# Patient Record
Sex: FEMALE | NOT HISPANIC OR LATINO | URBAN - METROPOLITAN AREA
[De-identification: names, ages, dates, MRNs, and addresses within clinical notes are randomized per-mention and may not be internally consistent; named-entity substitution may affect disease eponyms.]

---

## 2021-03-04 ENCOUNTER — TELEPHONE (OUTPATIENT)
Dept: GASTROENTEROLOGY | Facility: CLINIC | Age: 36
End: 2021-03-04

## 2021-03-08 ENCOUNTER — OFFICE VISIT (OUTPATIENT)
Dept: GASTROENTEROLOGY | Facility: CLINIC | Age: 36
End: 2021-03-08
Payer: COMMERCIAL

## 2021-03-08 VITALS
HEART RATE: 49 BPM | WEIGHT: 163 LBS | DIASTOLIC BLOOD PRESSURE: 72 MMHG | BODY MASS INDEX: 25.58 KG/M2 | SYSTOLIC BLOOD PRESSURE: 114 MMHG | HEIGHT: 67 IN | TEMPERATURE: 98.2 F

## 2021-03-08 DIAGNOSIS — K92.1 MELENA: ICD-10-CM

## 2021-03-08 DIAGNOSIS — R19.4 CHANGE IN BOWEL HABIT: ICD-10-CM

## 2021-03-08 DIAGNOSIS — R10.84 GENERALIZED ABDOMINAL PAIN: Primary | ICD-10-CM

## 2021-03-08 PROCEDURE — 99204 OFFICE O/P NEW MOD 45 MIN: CPT | Performed by: PHYSICIAN ASSISTANT

## 2021-03-08 RX ORDER — DESVENLAFAXINE 50 MG/1
25 TABLET, EXTENDED RELEASE ORAL DAILY
COMMUNITY

## 2021-03-08 RX ORDER — MONTELUKAST SODIUM 10 MG/1
TABLET ORAL
COMMUNITY
Start: 2021-02-05

## 2021-03-08 RX ORDER — LAMOTRIGINE 100 MG/1
1 TABLET ORAL DAILY
COMMUNITY
Start: 2021-02-08 | End: 2021-05-09

## 2021-03-08 RX ORDER — BIOTIN 2500 MCG
CAPSULE ORAL
COMMUNITY

## 2021-03-08 RX ORDER — ONDANSETRON 4 MG/1
4 TABLET, ORALLY DISINTEGRATING ORAL EVERY 8 HOURS PRN
COMMUNITY
Start: 2021-02-26 | End: 2022-02-27

## 2021-03-08 RX ORDER — ONDANSETRON 4 MG/1
TABLET, FILM COATED ORAL
COMMUNITY
Start: 2021-02-26

## 2021-03-08 RX ORDER — DESVENLAFAXINE 25 MG/1
TABLET, EXTENDED RELEASE ORAL
COMMUNITY
Start: 2021-02-08

## 2021-03-08 RX ORDER — PANTOPRAZOLE SODIUM 20 MG/1
TABLET, DELAYED RELEASE ORAL
COMMUNITY
Start: 2021-02-27 | End: 2021-10-13

## 2021-03-08 RX ORDER — DICYCLOMINE HCL 20 MG
20 TABLET ORAL EVERY 6 HOURS
Qty: 120 TABLET | Refills: 1 | Status: SHIPPED | OUTPATIENT
Start: 2021-03-08 | End: 2021-10-13 | Stop reason: SDUPTHER

## 2021-03-08 NOTE — PROGRESS NOTES
[facility] - Outpatient Follow-up Note  Ani Guzman 38 y o  female MRN: [id number]  Encounter: [id number]          ASSESSMENT AND PLAN:      Diagnoses and all orders for this visit:    1  Generalized abdominal pain  -     Colonoscopy; Future  -     EGD; Future  -     dicyclomine (BENTYL) 20 mg tablet; Take 1 tablet (20 mg total) by mouth every 6 (six) hours  2  Melena  -     EGD; Future  3  Change in bowel habit  -     Colonoscopy; Future    Patient presenting with several months of abdominal pain associated with black stools and a change in bowel habits to more predominant constipation  Differential includes PUD given history of NSAID use, possible celiac disease, Crohn's disease, or functional disorder  Will plan for EGD and colonoscopy for further information  Will continue PPI for now and also add PRN use of dicyclomine for abdominal pain  If endoscopic evaluation is negative, could consider biliary source for abdominal pain though this is considered less likely  As she also has very heavy periods with increased abdominal pain during her cycle, endometriosis would also be in the differential and can be considered after GI workup is complete  Discussed recommendations for bland diet in the meantime  All questions were answered and patient is in agreement with this plan   ______________________________________________________________________    SUBJECTIVE:  Jean Pierre Azul is a 38year old female who presents to the office with symptoms of abdominal pain and a change in bowel habits x several months  She states she has had "stomach problems" her whole life, but they were generally consistent  Several months ago she notices that her BMs moved from typically soft-to-loose to less frequent  She maintains the urge to have a bowel movement but is often unable to go  One month ago, she was seen in the ER with reportedly black stools and exam suggested hemorrhoids    She denies rectal pain or itching  At the time of her ER visit, she was taking Aleve frequently for menstrual cramping  She notes the black stools have resolved, though she continues with constipation and abdominal pain  Her pain is often down the right side of the abdomen and in the LLQ  It can at times refer to the left shoulder  She also has associated epigastric pain/bloating and a sense of fullness after meals  She endorses early satiety and avoidance of food secondary to worsening post-prandial pain  She has lost 2 5lbs in the past several weeks  There is a family history of polyps in her father  No known family history of GI malignancy  She denies family history of IBD but there is a strong history of other autoimmune disease like rheumatoid arthritis and lupus  She denies dysuria, hematuria or other urinary symptoms  Her periods are regular, but heavy and associated with severe pelvic cramping  She follows with her GYN regularly for this  She works as a  and notes that she sits for prolonged periods during the day  REVIEW OF SYSTEMS IS OTHERWISE NEGATIVE  Historical Information   History reviewed  Anxiety, depression, PTSD noted in ER visit notes  No abdominal surgery  Social History   Social History     Substance and Sexual Activity   Alcohol Use Yes    Frequency: Monthly or less     Social History     Substance and Sexual Activity   Drug Use Yes    Types: Marijuana     Social History     Tobacco Use   Smoking Status Never Smoker   Smokeless Tobacco Former User     History reviewed  No pertinent family history      Meds/Allergies       Current Outpatient Medications:     Biotin 2500 MCG CAPS    desvenlafaxine succinate (PRISTIQ) 50 mg 24 hr tablet    Desvenlafaxine Succinate ER 25 MG TB24    lamoTRIgine (LaMICtal) 100 mg tablet    montelukast (SINGULAIR) 10 mg tablet    ondansetron (ZOFRAN) 4 mg tablet    ondansetron (ZOFRAN-ODT) 4 mg disintegrating tablet    pantoprazole (PROTONIX) 20 mg tablet    dicyclomine (BENTYL) 20 mg tablet    Allergies   Allergen Reactions    Poison Ivy Extract Hives     Pesticides    Apple Swelling    Bupropion Hives    Pear Swelling    Strawberry Extract Swelling    Other Rash    Risperidone Rash and Hives    Sertraline Rash and Hives           Objective     Blood pressure 114/72, pulse (!) 49, temperature 98 2 °F (36 8 °C), temperature source Tympanic, height 5' 7" (1 702 m), weight 73 9 kg (163 lb)  Body mass index is 25 53 kg/m²  PHYSICAL EXAM:      General Appearance:   Alert, cooperative, no distress   HEENT:   Normocephalic, atraumatic, sclera anicteric      Neck:  Supple, symmetrical, no lymphadenopathy, trachea midline   Lungs:   Clear to auscultation bilaterally; no rales, rhonchi or wheezing; respirations unlabored    Heart[de-identified]   Bradycardic, Regular rhythm; no murmur, rub, or gallop  Abdomen:   Soft, generalized tenderness, worse in RUQ, LLQ, RLQ, without rebound or guarding non-distended; positive bowel sounds; no masses, no organomegaly   Abdominal palpation triggered "intense cramping" which persisted for the remainder of the visit     Genitalia:   Deferred    Rectal:   Deferred    Extremities:  No cyanosis, clubbing or edema    Pulses:  2+ and symmetric    Skin:  No jaundice, rashes, or lesions              Jay Quinones PA-C

## 2021-03-08 NOTE — PATIENT INSTRUCTIONS
Colon/egd on 3/29/21 with Dr Negar Hernández at [facility] prep instructions given by Ariella MEYER

## 2021-03-29 ENCOUNTER — HOSPITAL ENCOUNTER (OUTPATIENT)
Dept: GASTROENTEROLOGY | Facility: HOSPITAL | Age: 36
Setting detail: OUTPATIENT SURGERY
Discharge: HOME | End: 2021-03-29
Attending: INTERNAL MEDICINE
Payer: COMMERCIAL

## 2021-03-29 ENCOUNTER — ANESTHESIA EVENT (OUTPATIENT)
Dept: GASTROENTEROLOGY | Facility: HOSPITAL | Age: 36
End: 2021-03-29

## 2021-03-29 ENCOUNTER — ANESTHESIA (OUTPATIENT)
Dept: GASTROENTEROLOGY | Facility: HOSPITAL | Age: 36
End: 2021-03-29

## 2021-03-29 VITALS
SYSTOLIC BLOOD PRESSURE: 108 MMHG | HEART RATE: 45 BPM | RESPIRATION RATE: 18 BRPM | DIASTOLIC BLOOD PRESSURE: 56 MMHG | OXYGEN SATURATION: 100 % | TEMPERATURE: 96.6 F

## 2021-03-29 DIAGNOSIS — R10.84 GENERALIZED ABDOMINAL PAIN: ICD-10-CM

## 2021-03-29 DIAGNOSIS — R19.4 CHANGE IN BOWEL HABIT: ICD-10-CM

## 2021-03-29 DIAGNOSIS — K92.1 MELENA: ICD-10-CM

## 2021-03-29 PROBLEM — K21.9 GASTROESOPHAGEAL REFLUX DISEASE: Status: ACTIVE | Noted: 2021-03-29

## 2021-03-29 PROBLEM — F41.9 ANXIETY: Status: ACTIVE | Noted: 2021-03-29

## 2021-03-29 PROBLEM — F32.A DEPRESSION: Status: ACTIVE | Noted: 2021-03-29

## 2021-03-29 PROCEDURE — 88305 TISSUE EXAM BY PATHOLOGIST: CPT | Performed by: PATHOLOGY

## 2021-03-29 PROCEDURE — 43239 EGD BIOPSY SINGLE/MULTIPLE: CPT | Performed by: INTERNAL MEDICINE

## 2021-03-29 PROCEDURE — 45380 COLONOSCOPY AND BIOPSY: CPT | Performed by: INTERNAL MEDICINE

## 2021-03-29 PROCEDURE — 45385 COLONOSCOPY W/LESION REMOVAL: CPT | Performed by: INTERNAL MEDICINE

## 2021-03-29 RX ORDER — PROPOFOL 10 MG/ML
INJECTION, EMULSION INTRAVENOUS AS NEEDED
Status: DISCONTINUED | OUTPATIENT
Start: 2021-03-29 | End: 2021-03-29

## 2021-03-29 RX ORDER — SODIUM CHLORIDE 9 MG/ML
INJECTION, SOLUTION INTRAVENOUS CONTINUOUS PRN
Status: DISCONTINUED | OUTPATIENT
Start: 2021-03-29 | End: 2021-03-29

## 2021-03-29 RX ORDER — PROPOFOL 10 MG/ML
INJECTION, EMULSION INTRAVENOUS CONTINUOUS PRN
Status: DISCONTINUED | OUTPATIENT
Start: 2021-03-29 | End: 2021-03-29

## 2021-03-29 RX ORDER — SODIUM CHLORIDE 9 MG/ML
125 INJECTION, SOLUTION INTRAVENOUS CONTINUOUS
Status: CANCELLED | OUTPATIENT
Start: 2021-03-29

## 2021-03-29 RX ADMIN — SODIUM CHLORIDE: 9 INJECTION, SOLUTION INTRAVENOUS at 13:18

## 2021-03-29 RX ADMIN — PROPOFOL 100 MG: 10 INJECTION, EMULSION INTRAVENOUS at 13:32

## 2021-03-29 RX ADMIN — PROPOFOL 50 MG: 10 INJECTION, EMULSION INTRAVENOUS at 13:30

## 2021-03-29 RX ADMIN — PROPOFOL 50 MG: 10 INJECTION, EMULSION INTRAVENOUS at 13:56

## 2021-03-29 RX ADMIN — PROPOFOL 100 MG: 10 INJECTION, EMULSION INTRAVENOUS at 13:34

## 2021-03-29 RX ADMIN — LIDOCAINE HYDROCHLORIDE 80 MG: 20 INJECTION, SOLUTION INTRAVENOUS at 13:28

## 2021-03-29 RX ADMIN — PROPOFOL 100 MG: 10 INJECTION, EMULSION INTRAVENOUS at 13:43

## 2021-03-29 RX ADMIN — PROPOFOL 150 MCG/KG/MIN: 10 INJECTION, EMULSION INTRAVENOUS at 13:44

## 2021-03-29 RX ADMIN — PROPOFOL 150 MG: 10 INJECTION, EMULSION INTRAVENOUS at 13:28

## 2021-03-29 RX ADMIN — PROPOFOL 100 MG: 10 INJECTION, EMULSION INTRAVENOUS at 13:46

## 2021-03-29 NOTE — ANESTHESIA PREPROCEDURE EVALUATION
Procedure:  COLONOSCOPY  EGD    Relevant Problems   ANESTHESIA (within normal limits)   (-) History of anesthesia complications      CARDIO   (-) Chest pain   (-) GILBERT (dyspnea on exertion)      GI/HEPATIC   (+) Gastroesophageal reflux disease      NEURO/PSYCH   (+) Anxiety   (+) Depression      PULMONARY   (-) Shortness of breath   (-) URI (upper respiratory infection)        Physical Exam    Airway    Mallampati score: I  TM Distance: >3 FB  Neck ROM: full     Dental   No notable dental hx     Cardiovascular      Pulmonary      Other Findings        Anesthesia Plan  ASA Score- 2     Anesthesia Type- IV sedation with anesthesia with ASA Monitors  Additional Monitors:   Airway Plan:           Plan Factors-Exercise tolerance (METS): >4 METS  Chart reviewed  Induction- intravenous  Postoperative Plan-     Informed Consent- Anesthetic plan and risks discussed with patient  I personally reviewed this patient with the CRNA  Discussed and agreed on the Anesthesia Plan with the CRNA  Giselle Sinclair

## 2021-03-29 NOTE — ANESTHESIA POSTPROCEDURE EVALUATION
Post-Op Assessment Note    CV Status:  Stable    Pain management: adequate     Mental Status:  Sleepy (responds to name)   Hydration Status:  Euvolemic   PONV Controlled:  Controlled   Airway Patency:  Patent      Post Op Vitals Reviewed: Yes      Staff: Anesthesiologist, CRNA   Comments: VSS, reflexes intact        No complications documented      BP      Temp     Pulse 70 (03/29/21 1411)   Resp 16 (03/29/21 1411)    SpO2 99 % (03/29/21 1411)

## 2021-03-29 NOTE — H&P
History and Physical -  [facility]  Lisa Celeste 38 y o  female MRN: [id number]    HPI: Lisa Celeste is a 38y o  year old female who presents with abdominal pain, diarrhea, melena  Review of Systems    Historical Information   Past Medical History:   Diagnosis Date    Anxiety     Depression      Past Surgical History:   Procedure Laterality Date    ULNAR NERVE REPAIR       Social History   Social History     Substance and Sexual Activity   Alcohol Use Yes    Frequency: Monthly or less     Social History     Substance and Sexual Activity   Drug Use Yes    Types: Marijuana     Social History     Tobacco Use   Smoking Status Former Smoker   Smokeless Tobacco Former User     History reviewed  No pertinent family history  Meds/Allergies     (Not in a hospital admission)      Allergies   Allergen Reactions    Poison Ivy Extract Hives     Pesticides    Apple Swelling    Bupropion Hives    Pear Swelling    Strawberry Extract Swelling    Other Rash    Risperidone Rash and Hives    Sertraline Rash and Hives       Objective     /57   Pulse (!) 45   Temp (!) 96 7 °F (35 9 °C) (Tympanic)   Resp 18   LMP 03/24/2021   SpO2 100%       PHYSICAL EXAM    Gen: NAD  CV: RRR  CHEST: Clear  ABD: soft, NT/ND  EXT: no edema  Neuro: AAO      ASSESSMENT/PLAN:  This is a 38y o  year old female here for EGD and Colonsocopy for abdominal pain, diarrhea and melena  PLAN:   Procedure: EGD and Colonoscopy

## 2021-04-27 ENCOUNTER — TELEPHONE (OUTPATIENT)
Dept: GASTROENTEROLOGY | Facility: MEDICAL CENTER | Age: 36
End: 2021-04-27

## 2021-04-27 NOTE — TELEPHONE ENCOUNTER
----- Message from Wendy Barnes MD sent at 4/26/2021  9:02 AM EDT -----  Please call patient :   biopsies are mostly benign  Colon polyps were benign but precancerous  Recommend repeat colonoscopy in 5 years  Recommend siblings to have their colonoscopies done as well

## 2021-10-13 ENCOUNTER — OFFICE VISIT (OUTPATIENT)
Dept: GASTROENTEROLOGY | Facility: CLINIC | Age: 36
End: 2021-10-13
Payer: COMMERCIAL

## 2021-10-13 VITALS
WEIGHT: 157.2 LBS | DIASTOLIC BLOOD PRESSURE: 68 MMHG | TEMPERATURE: 96.1 F | SYSTOLIC BLOOD PRESSURE: 110 MMHG | BODY MASS INDEX: 24.67 KG/M2 | HEIGHT: 67 IN

## 2021-10-13 DIAGNOSIS — R10.11 RUQ ABDOMINAL PAIN: ICD-10-CM

## 2021-10-13 DIAGNOSIS — K58.2 IRRITABLE BOWEL SYNDROME WITH BOTH CONSTIPATION AND DIARRHEA: ICD-10-CM

## 2021-10-13 DIAGNOSIS — Z85.038 ENCOUNTER FOR FOLLOW-UP SURVEILLANCE OF COLON CANCER: ICD-10-CM

## 2021-10-13 DIAGNOSIS — Z08 ENCOUNTER FOR FOLLOW-UP SURVEILLANCE OF COLON CANCER: ICD-10-CM

## 2021-10-13 DIAGNOSIS — R10.84 GENERALIZED ABDOMINAL PAIN: Primary | ICD-10-CM

## 2021-10-13 PROCEDURE — 99214 OFFICE O/P EST MOD 30 MIN: CPT | Performed by: INTERNAL MEDICINE

## 2021-10-13 RX ORDER — DICYCLOMINE HCL 20 MG
20 TABLET ORAL EVERY 6 HOURS
Qty: 120 TABLET | Refills: 1 | Status: SHIPPED | OUTPATIENT
Start: 2021-10-13

## 2021-10-13 RX ORDER — PSEUDOEPHEDRINE HCL 30 MG
TABLET ORAL
COMMUNITY
Start: 2021-06-14 | End: 2022-06-15

## 2021-10-13 RX ORDER — BUSPIRONE HYDROCHLORIDE 15 MG/1
TABLET ORAL
COMMUNITY
Start: 2021-09-14

## 2021-10-13 RX ORDER — BENZONATATE 100 MG/1
CAPSULE ORAL
COMMUNITY
Start: 2021-09-29

## 2021-10-13 RX ORDER — ALBUTEROL SULFATE 90 UG/1
AEROSOL, METERED RESPIRATORY (INHALATION)
COMMUNITY
Start: 2021-09-29

## 2021-10-13 RX ORDER — NALTREXONE HYDROCHLORIDE 50 MG/1
TABLET, FILM COATED ORAL
COMMUNITY
Start: 2021-09-14 | End: 2021-12-13

## 2022-01-27 ENCOUNTER — TELEPHONE (OUTPATIENT)
Dept: GASTROENTEROLOGY | Facility: AMBULARY SURGERY CENTER | Age: 37
End: 2022-01-27

## 2022-01-27 NOTE — TELEPHONE ENCOUNTER
I returned patients call, pt had imaging done at [facility] and would like you to review    I advised for her to have imaging faxed to our office    Patient states maybe you can look online through  portal    Patient would like to be cleared for Hysterectomy     Thank You

## 2022-01-27 NOTE — TELEPHONE ENCOUNTER
Patients GI provider:  Dr Brandon Falcon    Number to return call: [phone]     Reason for call: Pt calling requesting to speak with someone regarding a medical  clearance for surgery and to review a photo of her liver       Scheduled procedure/appointment date if applicable: Apt/procedure n/a

## 2022-02-23 ENCOUNTER — TELEPHONE (OUTPATIENT)
Dept: GASTROENTEROLOGY | Facility: CLINIC | Age: 37
End: 2022-02-23

## 2022-03-09 ENCOUNTER — TELEMEDICINE (OUTPATIENT)
Dept: GASTROENTEROLOGY | Facility: CLINIC | Age: 37
End: 2022-03-09
Payer: COMMERCIAL

## 2022-03-09 DIAGNOSIS — D18.03 LIVER HEMANGIOMA: ICD-10-CM

## 2022-03-09 DIAGNOSIS — K59.00 CONSTIPATION, UNSPECIFIED CONSTIPATION TYPE: Primary | ICD-10-CM

## 2022-03-09 DIAGNOSIS — R10.84 GENERALIZED ABDOMINAL PAIN: ICD-10-CM

## 2022-03-09 DIAGNOSIS — K56.1 INTUSSUSCEPTION (HCC): ICD-10-CM

## 2022-03-09 PROCEDURE — 99214 OFFICE O/P EST MOD 30 MIN: CPT | Performed by: INTERNAL MEDICINE

## 2022-03-09 RX ORDER — POLYETHYLENE GLYCOL 3350 17 G/17G
17 POWDER, FOR SOLUTION ORAL DAILY
Qty: 30 EACH | Refills: 3 | Status: SHIPPED | OUTPATIENT
Start: 2022-03-09
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
## 2022-03-11 ENCOUNTER — TELEPHONE (OUTPATIENT)
Dept: OTHER | Facility: OTHER | Age: 37
End: 2022-03-11

## 2022-03-11 NOTE — TELEPHONE ENCOUNTER
Pt called in stating her surgeon, Dr Vianney Raymond has had some difficulty reaching this office and would like clearance faxed to [phone]

## 2022-03-14 ENCOUNTER — TELEPHONE (OUTPATIENT)
Dept: GASTROENTEROLOGY | Facility: CLINIC | Age: 37
End: 2022-03-14

## 2022-03-14 NOTE — TELEPHONE ENCOUNTER
Patients GI provider:  Dr Hermelinda Dong    Number to return call: [phone]    Reason for call: Lenn Slot from [address] [facility] called requesting clearance for pt's upcoming surgery on 03/16/22  Pt was seen by Dr Hermelinda Dong  03/09/22  A clearance form will be faxed to [facility]  tomorrow when Lenn Slot comes back into the office  Please have someone fill out and fax back asap  A clearance form was faxed on 02/22/22 but I did not see anything in pt's chart      Scheduled procedure/appointment date if applicable: NA

## 2023-05-10 ENCOUNTER — TRANSCRIBE ORDERS (OUTPATIENT)
Dept: GASTROENTEROLOGY | Facility: CLINIC | Age: 38
End: 2023-05-10

## 2023-05-10 ENCOUNTER — TELEPHONE (OUTPATIENT)
Dept: GASTROENTEROLOGY | Facility: CLINIC | Age: 38
End: 2023-05-10

## 2023-05-17 ENCOUNTER — CONSULT (OUTPATIENT)
Dept: GASTROENTEROLOGY | Facility: MEDICAL CENTER | Age: 38
End: 2023-05-17

## 2023-05-17 VITALS
TEMPERATURE: 98.7 F | HEART RATE: 66 BPM | WEIGHT: 151 LBS | DIASTOLIC BLOOD PRESSURE: 77 MMHG | BODY MASS INDEX: 23.65 KG/M2 | SYSTOLIC BLOOD PRESSURE: 128 MMHG

## 2023-05-17 DIAGNOSIS — D12.6 COLON ADENOMAS: ICD-10-CM

## 2023-05-17 DIAGNOSIS — R10.13 ABDOMINAL WALL PAIN IN EPIGASTRIC REGION: ICD-10-CM

## 2023-05-17 DIAGNOSIS — R10.13 EPIGASTRIC PAIN: Primary | ICD-10-CM

## 2023-05-17 DIAGNOSIS — R11.0 NAUSEA: ICD-10-CM

## 2023-05-17 RX ORDER — OMEPRAZOLE 20 MG/1
20 CAPSULE, DELAYED RELEASE ORAL DAILY
Qty: 30 CAPSULE | Refills: 2 | Status: SHIPPED | OUTPATIENT
Start: 2023-05-17

## 2023-05-17 NOTE — PROGRESS NOTES
[facility] - Outpatient Follow-up Note  Queen Courtney 42 y o  female MRN: [id number]  Encounter: [id number]          ASSESSMENT AND PLAN:    41-year-old female with prior diagnoses of IBS-C and GERD referred for an acute episode of severe upper abdominal wall pain, nausea, vomiting  Based on description of this episode, I am most suspicious for a musculoskeletal etiology  She does report that after the pain developed she did have some nausea and vomiting but sounds like this was more related to severe pain as opposed to an underlying GI process  Reviewed her ED visit and CT scan and discussed that there is no evidence of a hernia  On my physical exam, I do not see any diastasis recti and even if she did have this, I would not expect it to cause significant pain  1  Abdominal wall pain in epigastric region  2  Epigastric pain  3  Nausea  Monitor for recurrence of symptoms  Encouraged patient to discuss with her PCP whether referral to physical therapy for core strengthening/stabilization may be of benefit  If episode recurs, would try PPI though it does not sound peptic in nature and would not expect a peptic process to resolve without intervention in 4 days  - omeprazole (PriLOSEC) 20 mg delayed release capsule; Take 1 capsule (20 mg total) by mouth daily  Dispense: 30 capsule; Refill: 2      4  Colon adenomas  Due for repeat colonoscopy in 2026  ______________________________________________________________________    SUBJECTIVE:    Was on a plane and bent over  Developed had lump protruding from abdomen with severe pain in the area  Had to stretch up to try to relieve lump/pain  When got off the flight, had nausea, vomiting and upper abdominal pain  Upper abdominal pain felt like she had done a million crunches and muscles got stuck  Spent 4 days on bed rest because she thought she had a hernia  Gradually increased activity and advanced diet    Went to ED and was told she has an ovarian cyst and diastasis recti  Not having any acid reflux  Has been doing well from a GI perspective  Not requiring Bentyl, PPI, or other GI medications  CT 5/10/23  Abdomen:   Lung Bases: Mild dependent atelectasis is noted in the lower lobes  Calcified granulomas noted in the left lower lobe  Liver: Probable meningioma in the right lobe is grossly unchanged since prior exam  The liver is otherwise unremarkable  Gallbladder/Bile ducts: Normal      Spleen: Normal      Pancreas: Normal      Adrenal glands: Normal      Kidneys/Ureters: Unremarkable  Bowel/Mesentery: Normal  The appendix appears normal      Lymph nodes: Normal      Vessels: Normal      Abdominal Wall: Normal  No evidence of abdominal wall hernia or mass lesion        Pelvis:   Uterus is surgically absent  Rim-enhancing lesion in the right posterior pelvis   measures 21 x 17 mm  This can indicate a normal right ovarian follicle or   possibly a cyst      Urinary Bladder: Normal      Lymph nodes:  Normal        Bones: No acute osseous abnormality  Last seen 10/13/21 for IBS-C, abdominal pain, dyspepsia, and colon polyps  EGD 3/29/21  Mild nodular esophagus - biopsies done  Normal stomach and duodenum - biopsies done  A  Duodenum:  - Benign duodenal mucosa without specific histopathologic abnormality   - No villous atrophy, no intraepithelial lymphocytosis and no crypt hyperplasia to suggest malabsorptive enteropathy   - No active or chronic duodenitis  - No epithelial dysplasia and no evidence of malignancy  B  Stomach biopsy:  - Minimal chronic inactive oxyntic gastritis, negative for curvilinear Helicobacter pylori organisms by routine H&E stains   - No atrophy or intestinal metaplasia identified   - No epithelial dysplasia and no evidence of malignancy      C   Esophagus:  - Benign squamous epithelium without specific histopathologic abnormality   - Eosinophils are fewer than 2 cells per high power field in squamous epithelium, negative for eosinophilic esophagitis  - No intestinal [goblet cell] metaplasia; no columnar epithelium seen  - No epithelial dysplasia and no evidence of malignancy  Colonoscopy 3/29/21  Small hepatic flexure polyps - removed  Otherwise normal colon - random biopsies done in colon and terminal ileum  D  Terminal ileum:  - Benign small intestinal mucosa without specific histopathologic abnormality   - No villous atrophy, no intraepithelial lymphocytosis and no crypt hyperplasia to suggest malabsorptive enteropathy   - No active or chronic enteritis  - No epithelial dysplasia and no evidence of malignancy      E  Random colon:  - Benign colonic mucosa with nonspecific reactive changes including mild eosinophilia  - No thickened basement membranes or intraepithelial lymphocytosis to suggest microscopic colitis (i e  collagenous colitis or lymphocytic colitis, resp  )   - No active or chronic colitis  - No glandular dysplasia and no evidence of malignancy      F  Colon @ hepatic flexure polyp x 3:  - Tubular adenoma (adenomatous polyp) x 3   - No high grade dysplasia and no evidence of malignancy  REVIEW OF SYSTEMS IS OTHERWISE NEGATIVE  Historical Information   Past Medical History:   Diagnosis Date   • Anxiety    • Depression      Past Surgical History:   Procedure Laterality Date   • COLONOSCOPY  03/29/2021   • ULNAR NERVE REPAIR     • UPPER GASTROINTESTINAL ENDOSCOPY  03/29/2021     Social History   Social History     Substance and Sexual Activity   Alcohol Use Yes     Social History     Substance and Sexual Activity   Drug Use Yes   • Types: Marijuana     Social History     Tobacco Use   Smoking Status Former   Smokeless Tobacco Former     History reviewed  No pertinent family history      Meds/Allergies       Current Outpatient Medications:   •  albuterol (PROVENTIL HFA,VENTOLIN HFA) 90 mcg/act inhaler  •  benzonatate (TESSALON PERLES) 100 mg capsule  •  montelukast (SINGULAIR) 10 mg tablet  •  Biotin 2500 MCG CAPS  •  busPIRone (BUSPAR) 15 mg tablet  •  desvenlafaxine succinate (PRISTIQ) 50 mg 24 hr tablet  •  Desvenlafaxine Succinate ER 25 MG TB24  •  dicyclomine (BENTYL) 20 mg tablet  •  Docusate Sodium (DSS) 100 MG CAPS  •  lamoTRIgine (LaMICtal) 100 mg tablet  •  naltrexone (REVIA) 50 mg tablet  •  ondansetron (ZOFRAN) 4 mg tablet  •  ondansetron (ZOFRAN-ODT) 4 mg disintegrating tablet  •  polyethylene glycol (MIRALAX) 17 g packet    Allergies   Allergen Reactions   • Poison Ivy Extract Hives     Pesticides   • Apple - Food Allergy Swelling   • Bupropion Hives   • Pear - Food Allergy Swelling   • Strawberry Extract - Food Allergy Swelling   • Other Rash   • Risperidone Rash and Hives   • Sertraline Rash and Hives           Objective     Blood pressure 128/77, pulse 66, temperature 98 7 °F (37 1 °C), weight 68 5 kg (151 lb)  Body mass index is 23 65 kg/m²  PHYSICAL EXAM:      General Appearance:   Alert, cooperative, no distress   HEENT:   Normocephalic, atraumatic, anicteric  Neck:  Supple, symmetrical, trachea midline   Lungs:   Clear to auscultation bilaterally; no rales, rhonchi or wheezing; respirations unlabored    Heart[de-identified]   Regular rate and rhythm; no murmur, rub, or gallop  Abdomen:   Soft, non-tender, non-distended; normal bowel sounds; no masses, no organomegaly    Genitalia:   Deferred    Rectal:   Deferred    Extremities:  No cyanosis, clubbing or edema    Pulses:  2+ and symmetric    Skin:  No jaundice, rashes, or lesions    Lymph nodes:  No palpable cervical lymphadenopathy        Lab Results:   No visits with results within 1 Day(s) from this visit     Latest known visit with results is:   Hospital Outpatient Visit on 03/29/2021   Component Date Value   • Case Report 03/29/2021                      Value:Surgical Pathology Report                         Case: [id number]                                   Authorizing Provider:  Corine Ruiz MD Collected:           03/29/2021 1331              Ordering Location:     [address] [place] [address]      Received:            03/29/2021 [facility]                                                           Pathologist:           Sara Donnelly MD                                                         Specimens:   A) - Duodenum                                                                                       B) - Stomach, r/o h  pylori                                                                         C) - Esophagus                                                                                      D) - Terminal Ileum                                                                                 E) - Colon, random                                                                                                            F) - Polyp, Colorectal, hepatic flexure polyp x 3- cold snare                             • Final Diagnosis 03/29/2021                      Value: This result contains rich text formatting which cannot be displayed here  • Additional Information 03/29/2021                      Value: This result contains rich text formatting which cannot be displayed here  • Synoptic Checklist 03/29/2021                      Value:  (COLON/RECTUM POLYP FORM - GI - F)                                                                                                                 : Adenoma(s)     • Gross Description 03/29/2021                      Value: This result contains rich text formatting which cannot be displayed here  Radiology Results:   No results found

## 2023-11-02 ENCOUNTER — OFFICE VISIT (OUTPATIENT)
Dept: URGENT CARE | Facility: CLINIC | Age: 38
End: 2023-11-02
Payer: OTHER GOVERNMENT

## 2023-11-02 VITALS
HEART RATE: 47 BPM | OXYGEN SATURATION: 98 % | TEMPERATURE: 98 F | BODY MASS INDEX: 25.22 KG/M2 | HEIGHT: 67 IN | DIASTOLIC BLOOD PRESSURE: 66 MMHG | SYSTOLIC BLOOD PRESSURE: 115 MMHG | RESPIRATION RATE: 17 BRPM | WEIGHT: 160.69 LBS

## 2023-11-02 DIAGNOSIS — H57.89 DISCHARGE OF RIGHT EYE: ICD-10-CM

## 2023-11-02 DIAGNOSIS — H10.021 MUCOPURULENT CONJUNCTIVITIS, RIGHT: Primary | ICD-10-CM

## 2023-11-02 DIAGNOSIS — R51.9 ACUTE NONINTRACTABLE HEADACHE, UNSPECIFIED HEADACHE TYPE: ICD-10-CM

## 2023-11-02 DIAGNOSIS — H57.11 ACUTE RIGHT EYE PAIN: ICD-10-CM

## 2023-11-02 DIAGNOSIS — H57.89 REDNESS OF RIGHT EYE: ICD-10-CM

## 2023-11-02 PROCEDURE — 96372 PR INJECTION,THERAP/PROPH/DIAG2ST, IM OR SUBCUT: ICD-10-PCS | Mod: S$GLB,,, | Performed by: PHYSICIAN ASSISTANT

## 2023-11-02 PROCEDURE — 96372 THER/PROPH/DIAG INJ SC/IM: CPT | Mod: S$GLB,,, | Performed by: PHYSICIAN ASSISTANT

## 2023-11-02 PROCEDURE — 99203 PR OFFICE/OUTPT VISIT, NEW, LEVL III, 30-44 MIN: ICD-10-PCS | Mod: 25,S$GLB,, | Performed by: PHYSICIAN ASSISTANT

## 2023-11-02 PROCEDURE — 99203 OFFICE O/P NEW LOW 30 MIN: CPT | Mod: 25,S$GLB,, | Performed by: PHYSICIAN ASSISTANT

## 2023-11-02 RX ORDER — KETOROLAC TROMETHAMINE 30 MG/ML
30 INJECTION, SOLUTION INTRAMUSCULAR; INTRAVENOUS
Status: COMPLETED | OUTPATIENT
Start: 2023-11-02 | End: 2023-11-02

## 2023-11-02 RX ORDER — ACETAMINOPHEN 500 MG
1000 TABLET ORAL
Status: COMPLETED | OUTPATIENT
Start: 2023-11-02 | End: 2023-11-02

## 2023-11-02 RX ORDER — FLUOXETINE 20 MG/1
1 TABLET ORAL EVERY MORNING
COMMUNITY

## 2023-11-02 RX ORDER — GLUCOSAMINE/CHONDR SU A SOD 750-600 MG
TABLET ORAL
COMMUNITY

## 2023-11-02 RX ORDER — METHYLPHENIDATE HYDROCHLORIDE 5 MG/1
5 TABLET ORAL 2 TIMES DAILY
COMMUNITY

## 2023-11-02 RX ORDER — DESVENLAFAXINE SUCCINATE 25 MG/1
1 TABLET, EXTENDED RELEASE ORAL EVERY MORNING
COMMUNITY
Start: 2023-04-13

## 2023-11-02 RX ORDER — POLYMYXIN B SULFATE AND TRIMETHOPRIM 1; 10000 MG/ML; [USP'U]/ML
1 SOLUTION OPHTHALMIC EVERY 4 HOURS
Qty: 10 ML | Refills: 0 | Status: SHIPPED | OUTPATIENT
Start: 2023-11-02 | End: 2023-11-09

## 2023-11-02 RX ORDER — DESVENLAFAXINE SUCCINATE 50 MG/1
25 TABLET, EXTENDED RELEASE ORAL
COMMUNITY

## 2023-11-02 RX ORDER — METHYLPHENIDATE HYDROCHLORIDE 36 MG/1
36 TABLET ORAL
COMMUNITY
Start: 2023-10-30

## 2023-11-02 RX ORDER — METHOCARBAMOL 750 MG/1
750 TABLET, FILM COATED ORAL 4 TIMES DAILY PRN
COMMUNITY

## 2023-11-02 RX ORDER — MONTELUKAST SODIUM 10 MG/1
1 TABLET ORAL DAILY
COMMUNITY
Start: 2022-11-23 | End: 2023-11-24

## 2023-11-02 RX ORDER — METHYLPHENIDATE HYDROCHLORIDE 36 MG/1
36 TABLET ORAL EVERY MORNING
COMMUNITY
Start: 2023-10-30

## 2023-11-02 RX ORDER — LAMOTRIGINE 25 MG/1
25 TABLET ORAL EVERY MORNING
COMMUNITY

## 2023-11-02 RX ORDER — OMEPRAZOLE 20 MG/1
20 CAPSULE, DELAYED RELEASE ORAL
COMMUNITY
Start: 2023-05-17

## 2023-11-02 RX ORDER — LAMOTRIGINE 100 MG/1
100 TABLET ORAL
COMMUNITY
Start: 2023-04-13

## 2023-11-02 RX ORDER — TRAZODONE HYDROCHLORIDE 100 MG/1
1 TABLET ORAL NIGHTLY PRN
COMMUNITY
Start: 2022-11-10 | End: 2024-04-13

## 2023-11-02 RX ORDER — METHYLPHENIDATE HYDROCHLORIDE 36 MG/1
TABLET ORAL
COMMUNITY
Start: 2023-10-30 | End: 2023-11-29

## 2023-11-02 RX ADMIN — Medication 1000 MG: at 01:11

## 2023-11-02 RX ADMIN — KETOROLAC TROMETHAMINE 30 MG: 30 INJECTION, SOLUTION INTRAMUSCULAR; INTRAVENOUS at 01:11

## 2023-11-02 NOTE — PATIENT INSTRUCTIONS
- Rest.    - Drink plenty of fluids.    - Acetaminophen (tylenol) or Ibuprofen (advil,motrin) as directed as needed for fever/pain. Avoid tylenol if you have a history of liver disease. Do not take ibuprofen if you have a history of GI bleeding, kidney disease, or if you take blood thinners.     - you received a shot of toradol in clinic today, so do not take ibuprofen or aleve until at least 8 hours from now.     - you also received 1000 mg of Tylenol today in clinic, do not take Tylenol again for 6 hours.    - Follow up with your eye doctor as directed in the next 3-5 days if symptoms persist.  You can call [phone] to schedule an appointment with the appropriate provider.    - Go to the ER or seek medical attention immediately if you develop new or worsening symptoms.     - You must understand that you have received an Urgent Care treatment only and that you may be released before all of your medical problems are known or treated.   - You, the patient, will arrange for follow up care as instructed.   - If your condition worsens or fails to improve we recommend that you receive another evaluation at the ER immediately or contact your PCP to discuss your concerns or return here.

## 2023-11-02 NOTE — PROGRESS NOTES
"Subjective:      Patient ID: Malini Truong is a 38 y.o. female.    Vitals:  height is 5' 7" (1.702 m) and weight is 72.9 kg (160 lb 11.5 oz). Her oral temperature is 98.3 °F (36.8 °C). Her blood pressure is 115/66 and her pulse is 47 (abnormal). Her respiration is 17 and oxygen saturation is 98%.     Chief Complaint: Eye Problem    Pt is a 38-year-old female who presents today with chief complaint of right eye redness, pain, discharge, itching, swelling.  presents today w/ rt eye irritation ; onset Saturday 10/28/23. Pt states that 6 days ago she began experiencing right eye irritation with feeling of dryness and possible foreign body sensation.  Patient states that she flushed the eye.  The following day symptoms worsened and I has been consistently red with discharge.  Discharge is sometimes watery, but often times purulent/pus/mucoid.  Patient states that when she wakes up in the morning her eye is swollen crusted shut with purulent discharge that she needs to use a warm compress on.  Patient states that due to the discharge she has a sensation of blurred vision of the right eye.  She wears glasses, does not wear contact lenses.  Patient states that right eye is sensitive to light since onset of symptoms.  She has used OTC drops.  No recent URI symptoms.  No injury or trauma.  No history of autoimmune disorder.  Due to the eye problem, she has experienced headache as well.  No fever.    Eye Problem   The right eye is affected. This is a new problem. The current episode started in the past 7 days. The problem occurs constantly. The problem has been unchanged. There was no injury mechanism. The pain is at a severity of 8/10. There is No known exposure to pink eye. She Does not wear contacts. Associated symptoms include blurred vision, an eye discharge, eye redness, itching and photophobia. Pertinent negatives include no double vision, fever, foreign body sensation, recent URI or vomiting. She has tried eye drops and " water for the symptoms. The treatment provided mild relief.       Constitution: Negative for chills, sweating, fatigue and fever.   HENT:  Negative for congestion and sore throat.    Neck: Negative for neck pain and neck stiffness.   Cardiovascular:  Negative for chest pain, leg swelling and palpitations.   Eyes:  Positive for eye discharge, eye itching, eye pain, eye redness, photophobia, blurred vision and eyelid swelling (in the morning). Negative for eye trauma, foreign body in eye, vision loss and double vision.   Respiratory:  Negative for cough, sputum production and shortness of breath.    Gastrointestinal:  Negative for abdominal pain, vomiting and diarrhea.   Genitourinary:  Negative for dysuria, frequency and urgency.   Musculoskeletal:  Negative for pain and joint swelling.   Skin:  Negative for color change and rash.   Neurological:  Positive for headaches. Negative for dizziness, light-headedness, facial drooping, speech difficulty, disorientation, altered mental status, numbness and tingling.   Psychiatric/Behavioral:  Negative for altered mental status and disorientation.       Objective:     Physical Exam   Constitutional: She is oriented to person, place, and time. She appears well-developed.  Non-toxic appearance. She does not appear ill. No distress.   HENT:   Head: Normocephalic and atraumatic.   Ears:   Right Ear: External ear normal.   Left Ear: External ear normal.   Nose: Nose normal.   Mouth/Throat: Oropharynx is clear and moist.   Eyes: EOM and lids are normal. Pupils are equal, round, and reactive to light. Lids are everted and swept, no foreign bodies found. Right eye exhibits discharge. Right eye exhibits no hordeolum. No foreign body present in the right eye. Right conjunctiva is injected.   Slit lamp exam:       The right eye shows no corneal abrasion. Extraocular movement intact vision grossly intact gaze aligned appropriately      Comments: EOMI PERRLA.  Right eye conjunctival  injection.  No stye.  No periorbital erythema or swelling.  No foreign body.  Fluorescein exam is negative.  Patient had improvement of discomfort with application of anesthetic drops.   Neck: Trachea normal and phonation normal. Neck supple.   Pulmonary/Chest: Effort normal. No accessory muscle usage. No tachypnea and no bradypnea.   Musculoskeletal: Normal range of motion.         General: Normal range of motion.   Neurological: no focal deficit. She is alert and oriented to person, place, and time. She displays no weakness, facial symmetry and no dysarthria. No cranial nerve deficit. Gait normal. GCS eye subscore is 4. GCS verbal subscore is 5. GCS motor subscore is 6.   Skin: Skin is warm, dry, intact and not diaphoretic.   Psychiatric: Her speech is normal and behavior is normal. Judgment and thought content normal.   Nursing note and vitals reviewed.      Assessment:     1. Mucopurulent conjunctivitis, right    2. Acute right eye pain    3. Redness of right eye    4. Discharge of right eye    5. Acute nonintractable headache, unspecified headache type      Vision Screening    Right eye Left eye Both eyes   Without correction      With correction 20/25 20/20 20/20       Plan:     - Discussed ddx, home care, tx options, and given follow up precautions.  I have reviewed the patient's chart to view previous visits, labs, and imaging to assess PMH and look for any trends or previous treatments.  Patient with signs and symptoms of acute mucopurulent infectious conjunctivitis.  Have also considered other etiologies such as acute glaucoma or inflammatory etiology such as scleritis, episcleritis, iritis, etc, which would need further evaluation by Ophthalmology for diagnosis and management.  However, given symptoms will begin treatment with antibiotic drops, and instructed follow-up with ophthalmology.  Instructed ER sooner p.r.n. for any new or worsening symptoms.  Patient expresses understanding, agrees with plan of  care.  Mucopurulent conjunctivitis, right  -     polymyxin B sulf-trimethoprim (POLYTRIM) 10,000 unit- 1 mg/mL Drop; Place 1 drop into the right eye every 4 (four) hours. for 7 days  Dispense: 10 mL; Refill: 0    Acute right eye pain    Redness of right eye    Discharge of right eye    Acute nonintractable headache, unspecified headache type  -     ketorolac injection 30 mg  -     acetaminophen tablet 1,000 mg      Patient Instructions   - Rest.    - Drink plenty of fluids.    - Acetaminophen (tylenol) or Ibuprofen (advil,motrin) as directed as needed for fever/pain. Avoid tylenol if you have a history of liver disease. Do not take ibuprofen if you have a history of GI bleeding, kidney disease, or if you take blood thinners.     - you received a shot of toradol in clinic today, so do not take ibuprofen or aleve until at least 8 hours from now.     - you also received 1000 mg of Tylenol today in clinic, do not take Tylenol again for 6 hours.    - Follow up with your eye doctor as directed in the next 3-5 days if symptoms persist.  You can call [phone] to schedule an appointment with the appropriate provider.    - Go to the ER or seek medical attention immediately if you develop new or worsening symptoms.     - You must understand that you have received an Urgent Care treatment only and that you may be released before all of your medical problems are known or treated.   - You, the patient, will arrange for follow up care as instructed.   - If your condition worsens or fails to improve we recommend that you receive another evaluation at the ER immediately or contact your PCP to discuss your concerns or return here.

## 2023-12-16 ENCOUNTER — HOSPITAL ENCOUNTER (OUTPATIENT)
Facility: HOSPITAL | Age: 38
Discharge: HOME | End: 2023-12-17
Attending: EMERGENCY MEDICINE | Admitting: OBSTETRICS & GYNECOLOGY
Payer: COMMERCIAL

## 2023-12-16 ENCOUNTER — APPOINTMENT (EMERGENCY)
Dept: CT IMAGING | Facility: HOSPITAL | Age: 38
End: 2023-12-16
Payer: COMMERCIAL

## 2023-12-16 ENCOUNTER — APPOINTMENT (EMERGENCY)
Dept: ULTRASOUND IMAGING | Facility: HOSPITAL | Age: 38
End: 2023-12-16
Payer: COMMERCIAL

## 2023-12-16 DIAGNOSIS — Z90.721 STATUS POST LEFT OOPHORECTOMY: ICD-10-CM

## 2023-12-16 DIAGNOSIS — N83.519 OVARIAN TORSION: Primary | ICD-10-CM

## 2023-12-16 DIAGNOSIS — R10.32 LEFT LOWER QUADRANT ABDOMINAL PAIN: ICD-10-CM

## 2023-12-16 LAB
ALBUMIN SERPL BCP-MCNC: 4.3 G/DL (ref 3.5–5)
ALP SERPL-CCNC: 43 U/L (ref 34–104)
ALT SERPL W P-5'-P-CCNC: 11 U/L (ref 7–52)
ANION GAP SERPL CALCULATED.3IONS-SCNC: 9 MMOL/L
AST SERPL W P-5'-P-CCNC: 22 U/L (ref 13–39)
BASOPHILS # BLD AUTO: 0.02 THOUSANDS/ÂΜL (ref 0–0.1)
BASOPHILS NFR BLD AUTO: 0 % (ref 0–1)
BILIRUB SERPL-MCNC: 0.5 MG/DL (ref 0.2–1)
BILIRUB UR QL STRIP: NEGATIVE
BUN SERPL-MCNC: 12 MG/DL (ref 5–25)
CALCIUM SERPL-MCNC: 9.4 MG/DL (ref 8.4–10.2)
CHLORIDE SERPL-SCNC: 103 MMOL/L (ref 96–108)
CLARITY UR: CLEAR
CO2 SERPL-SCNC: 25 MMOL/L (ref 21–32)
COLOR UR: YELLOW
CREAT SERPL-MCNC: 0.85 MG/DL (ref 0.6–1.3)
EOSINOPHIL # BLD AUTO: 0 THOUSAND/ÂΜL (ref 0–0.61)
EOSINOPHIL NFR BLD AUTO: 0 % (ref 0–6)
ERYTHROCYTE [DISTWIDTH] IN BLOOD BY AUTOMATED COUNT: 13.3 % (ref 11.6–15.1)
EXT PREGNANCY TEST URINE: NEGATIVE
EXT. CONTROL: NORMAL
GFR SERPL CREATININE-BSD FRML MDRD: 85 ML/MIN/1.73SQ M
GLUCOSE SERPL-MCNC: 127 MG/DL (ref 65–140)
GLUCOSE UR STRIP-MCNC: NEGATIVE MG/DL
HCT VFR BLD AUTO: 38.6 % (ref 34.8–46.1)
HGB BLD-MCNC: 12.6 G/DL (ref 11.5–15.4)
HGB UR QL STRIP.AUTO: NEGATIVE
IMM GRANULOCYTES # BLD AUTO: 0.04 THOUSAND/UL (ref 0–0.2)
IMM GRANULOCYTES NFR BLD AUTO: 0 % (ref 0–2)
KETONES UR STRIP-MCNC: ABNORMAL MG/DL
LEUKOCYTE ESTERASE UR QL STRIP: NEGATIVE
LIPASE SERPL-CCNC: 39 U/L (ref 11–82)
LYMPHOCYTES # BLD AUTO: 2.26 THOUSANDS/ÂΜL (ref 0.6–4.47)
LYMPHOCYTES NFR BLD AUTO: 21 % (ref 14–44)
MCH RBC QN AUTO: 27.4 PG (ref 26.8–34.3)
MCHC RBC AUTO-ENTMCNC: 32.6 G/DL (ref 31.4–37.4)
MCV RBC AUTO: 84 FL (ref 82–98)
MONOCYTES # BLD AUTO: 0.39 THOUSAND/ÂΜL (ref 0.17–1.22)
MONOCYTES NFR BLD AUTO: 4 % (ref 4–12)
NEUTROPHILS # BLD AUTO: 8.34 THOUSANDS/ÂΜL (ref 1.85–7.62)
NEUTS SEG NFR BLD AUTO: 75 % (ref 43–75)
NITRITE UR QL STRIP: NEGATIVE
NRBC BLD AUTO-RTO: 0 /100 WBCS
PH UR STRIP.AUTO: 7.5 [PH] (ref 4.5–8)
PLATELET # BLD AUTO: 295 THOUSANDS/UL (ref 149–390)
PMV BLD AUTO: 9.2 FL (ref 8.9–12.7)
POTASSIUM SERPL-SCNC: 3.7 MMOL/L (ref 3.5–5.3)
PROT SERPL-MCNC: 7.7 G/DL (ref 6.4–8.4)
PROT UR STRIP-MCNC: NEGATIVE MG/DL
RBC # BLD AUTO: 4.6 MILLION/UL (ref 3.81–5.12)
SODIUM SERPL-SCNC: 137 MMOL/L (ref 135–147)
SP GR UR STRIP.AUTO: 1.02 (ref 1–1.03)
UROBILINOGEN UR QL STRIP.AUTO: 0.2 E.U./DL
WBC # BLD AUTO: 11.05 THOUSAND/UL (ref 4.31–10.16)

## 2023-12-16 PROCEDURE — 83690 ASSAY OF LIPASE: CPT | Performed by: EMERGENCY MEDICINE

## 2023-12-16 PROCEDURE — G1004 CDSM NDSC: HCPCS

## 2023-12-16 PROCEDURE — 96376 TX/PRO/DX INJ SAME DRUG ADON: CPT

## 2023-12-16 PROCEDURE — 99285 EMERGENCY DEPT VISIT HI MDM: CPT | Performed by: EMERGENCY MEDICINE

## 2023-12-16 PROCEDURE — 96361 HYDRATE IV INFUSION ADD-ON: CPT

## 2023-12-16 PROCEDURE — 99284 EMERGENCY DEPT VISIT MOD MDM: CPT

## 2023-12-16 PROCEDURE — 96375 TX/PRO/DX INJ NEW DRUG ADDON: CPT

## 2023-12-16 PROCEDURE — 96374 THER/PROPH/DIAG INJ IV PUSH: CPT

## 2023-12-16 PROCEDURE — 81003 URINALYSIS AUTO W/O SCOPE: CPT

## 2023-12-16 PROCEDURE — 96372 THER/PROPH/DIAG INJ SC/IM: CPT

## 2023-12-16 PROCEDURE — 76830 TRANSVAGINAL US NON-OB: CPT

## 2023-12-16 PROCEDURE — 76856 US EXAM PELVIC COMPLETE: CPT

## 2023-12-16 PROCEDURE — 36415 COLL VENOUS BLD VENIPUNCTURE: CPT | Performed by: EMERGENCY MEDICINE

## 2023-12-16 PROCEDURE — 74177 CT ABD & PELVIS W/CONTRAST: CPT

## 2023-12-16 PROCEDURE — 80053 COMPREHEN METABOLIC PANEL: CPT | Performed by: EMERGENCY MEDICINE

## 2023-12-16 PROCEDURE — 85025 COMPLETE CBC W/AUTO DIFF WBC: CPT | Performed by: EMERGENCY MEDICINE

## 2023-12-16 PROCEDURE — 81025 URINE PREGNANCY TEST: CPT | Performed by: EMERGENCY MEDICINE

## 2023-12-16 RX ORDER — FENTANYL CITRATE 50 UG/ML
1 INJECTION, SOLUTION INTRAMUSCULAR; INTRAVENOUS ONCE
Status: COMPLETED | OUTPATIENT
Start: 2023-12-16 | End: 2023-12-16

## 2023-12-16 RX ORDER — HYDROMORPHONE HCL/PF 1 MG/ML
1 SYRINGE (ML) INJECTION ONCE
Status: COMPLETED | OUTPATIENT
Start: 2023-12-16 | End: 2023-12-16

## 2023-12-16 RX ORDER — HYDROMORPHONE HCL/PF 1 MG/ML
0.5 SYRINGE (ML) INJECTION ONCE
Status: COMPLETED | OUTPATIENT
Start: 2023-12-16 | End: 2023-12-16

## 2023-12-16 RX ORDER — HYDROXYZINE HYDROCHLORIDE 10 MG/1
10 TABLET, FILM COATED ORAL DAILY PRN
COMMUNITY

## 2023-12-16 RX ORDER — KETOROLAC TROMETHAMINE 30 MG/ML
30 INJECTION, SOLUTION INTRAMUSCULAR; INTRAVENOUS ONCE
Status: COMPLETED | OUTPATIENT
Start: 2023-12-16 | End: 2023-12-16

## 2023-12-16 RX ORDER — ONDANSETRON 2 MG/ML
1 INJECTION INTRAMUSCULAR; INTRAVENOUS ONCE
Status: COMPLETED | OUTPATIENT
Start: 2023-12-16 | End: 2023-12-16

## 2023-12-16 RX ORDER — ONDANSETRON 2 MG/ML
4 INJECTION INTRAMUSCULAR; INTRAVENOUS ONCE
Status: COMPLETED | OUTPATIENT
Start: 2023-12-16 | End: 2023-12-16

## 2023-12-16 RX ORDER — HALOPERIDOL 5 MG/ML
5 INJECTION INTRAMUSCULAR ONCE
Status: COMPLETED | OUTPATIENT
Start: 2023-12-16 | End: 2023-12-16

## 2023-12-16 RX ADMIN — IOHEXOL 100 ML: 350 INJECTION, SOLUTION INTRAVENOUS at 19:57

## 2023-12-16 RX ADMIN — SODIUM CHLORIDE 1000 ML: 0.9 INJECTION, SOLUTION INTRAVENOUS at 17:57

## 2023-12-16 RX ADMIN — HYDROMORPHONE HYDROCHLORIDE 0.5 MG: 1 INJECTION, SOLUTION INTRAMUSCULAR; INTRAVENOUS; SUBCUTANEOUS at 23:10

## 2023-12-16 RX ADMIN — ONDANSETRON 4 MG: 2 INJECTION INTRAMUSCULAR; INTRAVENOUS at 17:57

## 2023-12-16 RX ADMIN — HYDROMORPHONE HYDROCHLORIDE 1 MG: 1 INJECTION, SOLUTION INTRAMUSCULAR; INTRAVENOUS; SUBCUTANEOUS at 17:57

## 2023-12-16 RX ADMIN — KETOROLAC TROMETHAMINE 30 MG: 30 INJECTION, SOLUTION INTRAMUSCULAR; INTRAVENOUS at 21:17

## 2023-12-16 RX ADMIN — HALOPERIDOL LACTATE 5 MG: 5 INJECTION, SOLUTION INTRAMUSCULAR at 18:52

## 2023-12-16 NOTE — ED PROVIDER NOTES
History  Chief Complaint   Patient presents with    Abdominal Pain     Left sided abd pain starting this morning, gradually getting worse with vomiting. States hx of intestinal torsion.     38-year-old female with left-sided abdominal pain since this morning.  She has had about 3 or 4 episodes of vomiting.  No fevers, no urinary symptoms.  She states that she has had this pain before with ovarian torsion but it went away on its own.  No previous abdominal surgeries.  No history of kidney stone.  Patient received Zofran and fentanyl IV prehospital by EMS        Prior to Admission Medications   Prescriptions Last Dose Informant Patient Reported? Taking?   Biotin 2500 MCG CAPS Not Taking Self Yes No   Sig: Take by mouth   Patient not taking: Reported on 5/17/2023   Desvenlafaxine Succinate ER 25 MG TB24 Not Taking Self Yes No   Sig: TAKE ONE TABLET BY MOUTH EVERY MORNING   Patient not taking: Reported on 5/17/2023   Docusate Sodium (DSS) 100 MG CAPS  Self Yes Yes   Sig: TAKE TWO CAPSULES BY MOUTH TWICE A DAY AS NEEDED TO SOFTEN STOOL   albuterol (PROVENTIL HFA,VENTOLIN HFA) 90 mcg/act inhaler Not Taking Self Yes No   Sig: INHALE 2 PUFFS BY MOUTH EVERY 4 HOURS AS NEEDED FOR BREATHING   Patient not taking: Reported on 12/17/2023   benzonatate (TESSALON PERLES) 100 mg capsule Not Taking Self Yes No   Sig: TAKE ONE CAPSULE BY MOUTH THREE TIMES A DAY AS NEEDED FOR COUGH   Patient not taking: Reported on 12/16/2023   busPIRone (BUSPAR) 15 mg tablet  Self Yes No   desvenlafaxine succinate (PRISTIQ) 50 mg 24 hr tablet Not Taking Self Yes No   Sig: Take 25 mg by mouth daily   Patient not taking: Reported on 5/17/2023   dicyclomine (BENTYL) 20 mg tablet Not Taking  No No   Sig: Take 1 tablet (20 mg total) by mouth every 6 (six) hours   Patient not taking: Reported on 5/17/2023   hydrOXYzine HCL (ATARAX) 10 mg tablet   Yes Yes   Sig: Take 10 mg by mouth daily as needed for anxiety (takes 2 tabs PRN for sleep)   lamoTRIgine  (LaMICtal) 100 mg tablet  Self Yes No   Sig: Take 1 capsule by mouth daily   montelukast (SINGULAIR) 10 mg tablet Not Taking Self Yes No   Patient not taking: Reported on 12/17/2023   naltrexone (REVIA) 50 mg tablet  Self Yes No   Sig: TAKE ONE TABLET BY MOUTH AT BEDTIME   omeprazole (PriLOSEC) 20 mg delayed release capsule   No Yes   Sig: Take 1 capsule (20 mg total) by mouth daily   ondansetron (ZOFRAN) 4 mg tablet  Self Yes Yes   ondansetron (ZOFRAN-ODT) 4 mg disintegrating tablet  Self Yes No   Sig: Take 4 tablets by mouth every 8 (eight) hours as needed   polyethylene glycol (MIRALAX) 17 g packet   No Yes   Sig: Take 17 g by mouth daily      Facility-Administered Medications: None       Past Medical History:   Diagnosis Date    Anxiety     Depression        Past Surgical History:   Procedure Laterality Date    COLONOSCOPY  03/29/2021    EXAMINATION UNDER ANESTHESIA N/A 12/17/2023    Procedure: EXAM UNDER ANESTHESIA (EUA);  Surgeon: Susie Charles DO;  Location: [facility];  Service: Gynecology    TN LAPS ABD PRTM&OMENTUM DX W/WO SPEC BR/WA SPX N/A 12/17/2023    Procedure: LAPAROSCOPY DIAGNOSTIC LEFT OOPHORECTOMY AND FULGURATION OF ENDOMETRIOSIS;  Surgeon: Susie Charles DO;  Location: [facility];  Service: Gynecology    ULNAR NERVE REPAIR      UPPER GASTROINTESTINAL ENDOSCOPY  03/29/2021       History reviewed. No pertinent family history.  I have reviewed and agree with the history as documented.    E-Cigarette/Vaping     E-Cigarette/Vaping Substances     Social History     Tobacco Use    Smoking status: Former    Smokeless tobacco: Former   Substance Use Topics    Alcohol use: Yes    Drug use: Yes     Types: Marijuana       Review of Systems   Constitutional:  Negative for appetite change, fatigue and fever.   HENT:  Negative for rhinorrhea and sore throat.    Eyes:  Negative for pain.   Respiratory:  Negative for cough, shortness of breath and wheezing.    Cardiovascular:  Negative for chest pain and  leg swelling.   Gastrointestinal:  Positive for abdominal pain, nausea and vomiting. Negative for diarrhea.   Genitourinary:  Negative for dysuria and flank pain.   Musculoskeletal:  Negative for back pain and neck pain.   Skin:  Negative for rash.   Neurological:  Negative for syncope and headaches.   Psychiatric/Behavioral:          Mood normal       Physical Exam  Physical Exam  Vitals and nursing note reviewed.   Constitutional:       Appearance: She is well-developed.      Comments: Uncomfortable   HENT:      Head: Normocephalic and atraumatic.      Right Ear: External ear normal.      Left Ear: External ear normal.   Eyes:      General: No scleral icterus.     Extraocular Movements: Extraocular movements intact.   Cardiovascular:      Rate and Rhythm: Normal rate and regular rhythm.   Pulmonary:      Effort: Pulmonary effort is normal. No respiratory distress.      Breath sounds: Normal breath sounds.   Abdominal:      Palpations: Abdomen is soft.      Comments: Left-sided mid abdominal tenderness, no rebound/guarding   Musculoskeletal:         General: No deformity or signs of injury. Normal range of motion.      Cervical back: Normal range of motion and neck supple.   Skin:     General: Skin is warm and dry.      Coloration: Skin is not jaundiced or pale.   Neurological:      General: No focal deficit present.      Mental Status: She is alert and oriented to person, place, and time.   Psychiatric:         Mood and Affect: Mood normal.         Behavior: Behavior normal.         Vital Signs  ED Triage Vitals   Temperature Pulse Respirations Blood Pressure SpO2   12/16/23 2004 12/16/23 1739 12/16/23 1739 12/16/23 1739 12/16/23 1739   98.2 °F (36.8 °C) 76 (!) 24 127/78 100 %      Temp Source Heart Rate Source Patient Position - Orthostatic VS BP Location FiO2 (%)   12/16/23 2004 12/16/23 1739 12/16/23 1739 12/16/23 1739 --   Oral Monitor Sitting Left arm       Pain Score       12/16/23 1739       10 - Worst  Possible Pain           Vitals:    12/17/23 0711 12/17/23 0900 12/17/23 1113 12/17/23 1116   BP: 93/53 117/62 97/62 116/68   Pulse: (!) 50 55 66    Patient Position - Orthostatic VS: Lying Sitting Lying Lying         Visual Acuity      ED Medications  Medications   ondansetron (FOR EMS ONLY) (ZOFRAN) 4 mg/2 mL injection 4 mg (0 mg Does not apply Given to EMS 12/16/23 1749)   fentanyl citrate (PF) (FOR EMS ONLY) 100 mcg/2 mL injection 100 mcg (0 mcg Does not apply Given to EMS 12/16/23 1750)   sodium chloride 0.9 % bolus 1,000 mL (0 mL Intravenous Stopped 12/16/23 1945)   ondansetron (ZOFRAN) injection 4 mg (4 mg Intravenous Given 12/16/23 1757)   HYDROmorphone (DILAUDID) injection 1 mg (1 mg Intravenous Given 12/16/23 1757)   haloperidol lactate (HALDOL) injection 5 mg (5 mg Intramuscular Given 12/16/23 1852)   iohexol (OMNIPAQUE) 350 MG/ML injection (MULTI-DOSE) 100 mL (100 mL Intravenous Given 12/16/23 1957)   ketorolac (TORADOL) injection 30 mg (30 mg Intravenous Given 12/16/23 2117)   HYDROmorphone (DILAUDID) injection 0.5 mg (0.5 mg Intravenous Given 12/16/23 2310)   acetaminophen (Ofirmev) injection 1,000 mg (1,000 mg Intravenous Given 12/17/23 0156)       Diagnostic Studies  Results Reviewed       Procedure Component Value Units Date/Time    POCT pregnancy, urine [[id number]]  (Normal) Resulted: 12/16/23 2017    Lab Status: Final result Updated: 12/16/23 2049     EXT Preg Test, Ur Negative     Control Valid    Urine Macroscopic, POC [[id number]]  (Abnormal) Collected: 12/16/23 2015    Lab Status: Final result Specimen: Urine Updated: 12/16/23 2016     Color, UA Yellow     Clarity, UA Clear     pH, UA 7.5     Leukocytes, UA Negative     Nitrite, UA Negative     Protein, UA Negative mg/dl      Glucose, UA Negative mg/dl      Ketones, UA 15 (1+) mg/dl      Urobilinogen, UA 0.2 E.U./dl      Bilirubin, UA Negative     Occult Blood, UA Negative     Specific Gravity, UA 1.020    Narrative:      CLINITEK RESULT     Comprehensive metabolic panel [[id number]] Collected: 12/16/23 1800    Lab Status: Final result Specimen: Blood from Arm, Right Updated: 12/16/23 1829     Sodium 137 mmol/L      Potassium 3.7 mmol/L      Chloride 103 mmol/L      CO2 25 mmol/L      ANION GAP 9 mmol/L      BUN 12 mg/dL      Creatinine 0.85 mg/dL      Glucose 127 mg/dL      Calcium 9.4 mg/dL      AST 22 U/L      ALT 11 U/L      Alkaline Phosphatase 43 U/L      Total Protein 7.7 g/dL      Albumin 4.3 g/dL      Total Bilirubin 0.50 mg/dL      eGFR 85 ml/min/1.73sq m     Narrative:      National Kid[employer] Foundation guidelines for Chronic Kidney Disease (CKD):     Stage 1 with normal or high GFR (GFR > 90 mL/min/1.73 square meters)    Stage 2 Mild CKD (GFR = 60-89 mL/min/1.73 square meters)    Stage 3A Moderate CKD (GFR = 45-59 mL/min/1.73 square meters)    Stage 3B Moderate CKD (GFR = 30-44 mL/min/1.73 square meters)    Stage 4 Severe CKD (GFR = 15-29 mL/min/1.73 square meters)    Stage 5 End Stage CKD (GFR <15 mL/min/1.73 square meters)  Note: GFR calculation is accurate only with a steady state creatinine    Lipase [[id number]]  (Normal) Collected: 12/16/23 1800    Lab Status: Final result Specimen: Blood from Arm, Right Updated: 12/16/23 1829     Lipase 39 u/L     CBC and differential [[id number]]  (Abnormal) Collected: 12/16/23 1800    Lab Status: Final result Specimen: Blood from Arm, Right Updated: 12/16/23 1806     WBC 11.05 Thousand/uL      RBC 4.60 Million/uL      Hemoglobin 12.6 g/dL      Hematocrit 38.6 %      MCV 84 fL      MCH 27.4 pg      MCHC 32.6 g/dL      RDW 13.3 %      MPV 9.2 fL      Platelets 295 Thousands/uL      nRBC 0 /100 WBCs      Neutrophils Relative 75 %      Immat GRANS % 0 %      Lymphocytes Relative 21 %      Monocytes Relative 4 %      Eosinophils Relative 0 %      Basophils Relative 0 %      Neutrophils Absolute 8.34 Thousands/µL      Immature Grans Absolute 0.04 Thousand/uL      Lymphocytes Absolute 2.26 Thousands/µL       Monocytes Absolute 0.39 Thousand/µL      Eosinophils Absolute 0.00 Thousand/µL      Basophils Absolute 0.02 Thousands/µL                    US pelvis complete w transvaginal   Final Result by Kristopher Whitley MD (12/16 2310)   Addendum (preliminary) 1 of 1 by Kristopher Whitley MD (12/16 2310)   ADDENDUM:      Findings discussed with Dr. Crane at 11:01 p.m.      Final      Findings suspicious for left ovarian torsion as detailed above.      Workstation performed: [id number]         CT abdomen pelvis with contrast   Final Result by Chato Francisco MD (12/16 2049)      1.  No bowel obstruction. Mild diffuse thickening of the colon which may be due to nonspecific colitis.   2.  Asymmetric enlargement of the left ovary measuring up to 4.2 cm. Further evaluation with ultrasound may be obtained.         Workstation performed: [id number]                    Procedures  Procedures         ED Course                               SBIRT 20yo+      Flowsheet Row Most Recent Value   Initial Alcohol Screen: US AUDIT-C     1. How often do you have a drink containing alcohol? 0 Filed at: 12/16/2023 2049   2. How many drinks containing alcohol do you have on a typical day you are drinking?  0 Filed at: 12/16/2023 2049   3b. FEMALE Any Age, or MALE 65+: How often do you have 4 or more drinks on one occassion? 0 Filed at: 12/16/2023 2049   Audit-C Score 0 Filed at: 12/16/2023 2049   CATRACHITA: How many times in the past year have you...    Used an illegal drug or used a prescription medication for non-medical reasons? Never Filed at: 12/16/2023 2049                      Medical Decision Making  Amount and/or Complexity of Data Reviewed  Labs: ordered.  Radiology: ordered.    Risk  Prescription drug management.  Decision regarding hospitalization.             Disposition  Final diagnoses:   Ovarian torsion     Time reflects when diagnosis was documented in both MDM as applicable and the Disposition within this note       Time User Action Codes  Description Comment    12/16/2023 11:02 PM EtiennejosephagapitoKelinsamy HARMON Add [N83.519] Ovarian torsion     12/17/2023 12:11 AM Alfredito Celia Add [R10.32] Left lower quadrant abdominal pain     12/17/2023  2:31 AM Susie Calderon Modify [N83.519] Ovarian torsion     12/17/2023  2:31 AM Susie Calderon Modify [R10.32] Left lower quadrant abdominal pain     12/17/2023 11:11 AM Humera Phillips Modify [N83.519] Ovarian torsion     12/17/2023 11:11 AM Humera Phillips Modify [R10.32] Left lower quadrant abdominal pain     12/17/2023 11:11 AM Humera Phillips Add [Z90.721] Status post left oophorectomy           ED Disposition       ED Disposition   Admit    Condition   Stable    Date/Time   Sat Dec 16, 2023 5278    Comment   --             Follow-up Information       Follow up With Specialties Details Why Contact Info Additional Information    [facility] Follow up in 1 week(s)  [address] [place] [address] [phone] [facility], [address] [place] [address], [address], [address] [phone]            Discharge Medication List as of 12/17/2023 11:14 AM        START taking these medications    Details   acetaminophen (TYLENOL) 325 mg tablet Take 2 tablets (650 mg total) by mouth every 6 (six) hours as needed for mild pain, Starting Sun 12/17/2023, Normal      ibuprofen (MOTRIN) 600 mg tablet Take 1 tablet (600 mg total) by mouth every 6 (six) hours as needed for moderate pain, Starting Sun 12/17/2023, Normal           CONTINUE these medications which have NOT CHANGED    Details   Docusate Sodium (DSS) 100 MG CAPS TAKE TWO CAPSULES BY MOUTH TWICE A DAY AS NEEDED TO SOFTEN STOOL, Historical Med      hydrOXYzine HCL (ATARAX) 10 mg tablet Take 10 mg by mouth daily as needed for anxiety (takes 2 tabs PRN for sleep), Historical Med      omeprazole (PriLOSEC) 20 mg delayed release capsule Take 1  capsule (20 mg total) by mouth daily, Starting Wed 5/17/2023, Normal      ondansetron (ZOFRAN) 4 mg tablet Historical Med      polyethylene glycol (MIRALAX) 17 g packet Take 17 g by mouth daily, Starting Wed 3/9/2022, Normal      albuterol (PROVENTIL HFA,VENTOLIN HFA) 90 mcg/act inhaler INHALE 2 PUFFS BY MOUTH EVERY 4 HOURS AS NEEDED FOR BREATHING, Historical Med      benzonatate (TESSALON PERLES) 100 mg capsule TAKE ONE CAPSULE BY MOUTH THREE TIMES A DAY AS NEEDED FOR COUGH, Historical Med      Biotin 2500 MCG CAPS Take by mouth, Historical Med      busPIRone (BUSPAR) 15 mg tablet Starting Tue 9/14/2021, Historical Med      !! desvenlafaxine succinate (PRISTIQ) 50 mg 24 hr tablet Take 25 mg by mouth daily, Historical Med      !! Desvenlafaxine Succinate ER 25 MG TB24 TAKE ONE TABLET BY MOUTH EVERY MORNING, Historical Med      dicyclomine (BENTYL) 20 mg tablet Take 1 tablet (20 mg total) by mouth every 6 (six) hours, Starting Wed 10/13/2021, Normal      lamoTRIgine (LaMICtal) 100 mg tablet Take 1 capsule by mouth daily, Starting Mon 2/8/2021, Until Sun 5/9/2021, Historical Med      montelukast (SINGULAIR) 10 mg tablet Starting Fri 2/5/2021, Historical Med      naltrexone (REVIA) 50 mg tablet TAKE ONE TABLET BY MOUTH AT BEDTIME, Historical Med      ondansetron (ZOFRAN-ODT) 4 mg disintegrating tablet Take 4 tablets by mouth every 8 (eight) hours as needed, Starting Fri 2/26/2021, Until Sun 2/27/2022, Historical Med       !! - Potential duplicate medications found. Please discuss with provider.          No discharge procedures on file.    PDMP Review       None            ED Provider  Electronically Signed by             Savi Howe MD  12/22/23 1672

## 2023-12-17 ENCOUNTER — ANESTHESIA (EMERGENCY)
Dept: PERIOP | Facility: HOSPITAL | Age: 38
End: 2023-12-17
Payer: COMMERCIAL

## 2023-12-17 ENCOUNTER — ANESTHESIA EVENT (EMERGENCY)
Dept: PERIOP | Facility: HOSPITAL | Age: 38
End: 2023-12-17
Payer: COMMERCIAL

## 2023-12-17 VITALS
RESPIRATION RATE: 18 BRPM | SYSTOLIC BLOOD PRESSURE: 116 MMHG | OXYGEN SATURATION: 96 % | DIASTOLIC BLOOD PRESSURE: 68 MMHG | WEIGHT: 169.09 LBS | TEMPERATURE: 97.7 F | HEIGHT: 67 IN | BODY MASS INDEX: 26.54 KG/M2 | HEART RATE: 66 BPM

## 2023-12-17 PROBLEM — Z90.721 STATUS POST LEFT OOPHORECTOMY: Status: ACTIVE | Noted: 2023-12-17

## 2023-12-17 LAB
ABO GROUP BLD: NORMAL
BLD GP AB SCN SERPL QL: NEGATIVE
RH BLD: POSITIVE
SPECIMEN EXPIRATION DATE: NORMAL

## 2023-12-17 PROCEDURE — 96375 TX/PRO/DX INJ NEW DRUG ADDON: CPT

## 2023-12-17 PROCEDURE — 58661 LAPAROSCOPY REMOVE ADNEXA: CPT | Performed by: OBSTETRICS & GYNECOLOGY

## 2023-12-17 PROCEDURE — 88305 TISSUE EXAM BY PATHOLOGIST: CPT | Performed by: PATHOLOGY

## 2023-12-17 PROCEDURE — NC001 PR NO CHARGE: Performed by: STUDENT IN AN ORGANIZED HEALTH CARE EDUCATION/TRAINING PROGRAM

## 2023-12-17 PROCEDURE — 86900 BLOOD TYPING SEROLOGIC ABO: CPT

## 2023-12-17 PROCEDURE — 36415 COLL VENOUS BLD VENIPUNCTURE: CPT

## 2023-12-17 PROCEDURE — 58662 LAPAROSCOPY EXCISE LESIONS: CPT | Performed by: OBSTETRICS & GYNECOLOGY

## 2023-12-17 PROCEDURE — 96361 HYDRATE IV INFUSION ADD-ON: CPT

## 2023-12-17 PROCEDURE — 86901 BLOOD TYPING SEROLOGIC RH(D): CPT

## 2023-12-17 PROCEDURE — 86850 RBC ANTIBODY SCREEN: CPT

## 2023-12-17 PROCEDURE — NC001 PR NO CHARGE: Performed by: OBSTETRICS & GYNECOLOGY

## 2023-12-17 RX ORDER — ACETAMINOPHEN 325 MG/1
650 TABLET ORAL EVERY 6 HOURS PRN
Qty: 60 TABLET | Refills: 0 | Status: SHIPPED | OUTPATIENT
Start: 2023-12-17

## 2023-12-17 RX ORDER — SODIUM CHLORIDE, SODIUM LACTATE, POTASSIUM CHLORIDE, CALCIUM CHLORIDE 600; 310; 30; 20 MG/100ML; MG/100ML; MG/100ML; MG/100ML
125 INJECTION, SOLUTION INTRAVENOUS CONTINUOUS
Status: DISCONTINUED | OUTPATIENT
Start: 2023-12-17 | End: 2023-12-17

## 2023-12-17 RX ORDER — HYDROMORPHONE HCL/PF 1 MG/ML
0.5 SYRINGE (ML) INJECTION
Status: DISCONTINUED | OUTPATIENT
Start: 2023-12-17 | End: 2023-12-17 | Stop reason: HOSPADM

## 2023-12-17 RX ORDER — MEPERIDINE HYDROCHLORIDE 25 MG/ML
12.5 INJECTION INTRAMUSCULAR; INTRAVENOUS; SUBCUTANEOUS
Status: DISCONTINUED | OUTPATIENT
Start: 2023-12-17 | End: 2023-12-17 | Stop reason: HOSPADM

## 2023-12-17 RX ORDER — IBUPROFEN 600 MG/1
600 TABLET ORAL EVERY 6 HOURS PRN
Status: DISCONTINUED | OUTPATIENT
Start: 2023-12-17 | End: 2023-12-17 | Stop reason: HOSPADM

## 2023-12-17 RX ORDER — LIDOCAINE HYDROCHLORIDE 10 MG/ML
INJECTION, SOLUTION EPIDURAL; INFILTRATION; INTRACAUDAL; PERINEURAL AS NEEDED
Status: DISCONTINUED | OUTPATIENT
Start: 2023-12-17 | End: 2023-12-17

## 2023-12-17 RX ORDER — ROCURONIUM BROMIDE 10 MG/ML
INJECTION, SOLUTION INTRAVENOUS AS NEEDED
Status: DISCONTINUED | OUTPATIENT
Start: 2023-12-17 | End: 2023-12-17

## 2023-12-17 RX ORDER — ACETAMINOPHEN 10 MG/ML
1000 INJECTION, SOLUTION INTRAVENOUS ONCE
Status: COMPLETED | OUTPATIENT
Start: 2023-12-17 | End: 2023-12-17

## 2023-12-17 RX ORDER — ONDANSETRON 2 MG/ML
4 INJECTION INTRAMUSCULAR; INTRAVENOUS ONCE AS NEEDED
Status: DISCONTINUED | OUTPATIENT
Start: 2023-12-17 | End: 2023-12-17 | Stop reason: HOSPADM

## 2023-12-17 RX ORDER — ONDANSETRON 2 MG/ML
INJECTION INTRAMUSCULAR; INTRAVENOUS AS NEEDED
Status: DISCONTINUED | OUTPATIENT
Start: 2023-12-17 | End: 2023-12-17

## 2023-12-17 RX ORDER — BUPIVACAINE HYDROCHLORIDE 5 MG/ML
INJECTION, SOLUTION EPIDURAL; INTRACAUDAL AS NEEDED
Status: DISCONTINUED | OUTPATIENT
Start: 2023-12-17 | End: 2023-12-17 | Stop reason: HOSPADM

## 2023-12-17 RX ORDER — SODIUM CHLORIDE, SODIUM LACTATE, POTASSIUM CHLORIDE, CALCIUM CHLORIDE 600; 310; 30; 20 MG/100ML; MG/100ML; MG/100ML; MG/100ML
125 INJECTION, SOLUTION INTRAVENOUS CONTINUOUS
Status: CANCELLED | OUTPATIENT
Start: 2023-12-17

## 2023-12-17 RX ORDER — PROPOFOL 10 MG/ML
INJECTION, EMULSION INTRAVENOUS AS NEEDED
Status: DISCONTINUED | OUTPATIENT
Start: 2023-12-17 | End: 2023-12-17

## 2023-12-17 RX ORDER — ONDANSETRON 2 MG/ML
4 INJECTION INTRAMUSCULAR; INTRAVENOUS EVERY 6 HOURS PRN
Status: DISCONTINUED | OUTPATIENT
Start: 2023-12-17 | End: 2023-12-17 | Stop reason: HOSPADM

## 2023-12-17 RX ORDER — OXYCODONE HYDROCHLORIDE 10 MG/1
10 TABLET ORAL EVERY 4 HOURS PRN
Status: DISCONTINUED | OUTPATIENT
Start: 2023-12-17 | End: 2023-12-17 | Stop reason: HOSPADM

## 2023-12-17 RX ORDER — FENTANYL CITRATE 50 UG/ML
INJECTION, SOLUTION INTRAMUSCULAR; INTRAVENOUS AS NEEDED
Status: DISCONTINUED | OUTPATIENT
Start: 2023-12-17 | End: 2023-12-17

## 2023-12-17 RX ORDER — OXYCODONE HYDROCHLORIDE 5 MG/1
5 TABLET ORAL EVERY 4 HOURS PRN
Status: DISCONTINUED | OUTPATIENT
Start: 2023-12-17 | End: 2023-12-17 | Stop reason: HOSPADM

## 2023-12-17 RX ORDER — DEXAMETHASONE SODIUM PHOSPHATE 10 MG/ML
INJECTION, SOLUTION INTRAMUSCULAR; INTRAVENOUS AS NEEDED
Status: DISCONTINUED | OUTPATIENT
Start: 2023-12-17 | End: 2023-12-17

## 2023-12-17 RX ORDER — MIDAZOLAM HYDROCHLORIDE 2 MG/2ML
INJECTION, SOLUTION INTRAMUSCULAR; INTRAVENOUS AS NEEDED
Status: DISCONTINUED | OUTPATIENT
Start: 2023-12-17 | End: 2023-12-17

## 2023-12-17 RX ORDER — GLYCOPYRROLATE 0.2 MG/ML
INJECTION INTRAMUSCULAR; INTRAVENOUS AS NEEDED
Status: DISCONTINUED | OUTPATIENT
Start: 2023-12-17 | End: 2023-12-17

## 2023-12-17 RX ORDER — MAGNESIUM HYDROXIDE 1200 MG/15ML
LIQUID ORAL AS NEEDED
Status: DISCONTINUED | OUTPATIENT
Start: 2023-12-17 | End: 2023-12-17 | Stop reason: HOSPADM

## 2023-12-17 RX ORDER — IBUPROFEN 600 MG/1
600 TABLET ORAL EVERY 6 HOURS PRN
Qty: 30 TABLET | Refills: 0 | Status: SHIPPED | OUTPATIENT
Start: 2023-12-17

## 2023-12-17 RX ORDER — ACETAMINOPHEN 325 MG/1
975 TABLET ORAL EVERY 6 HOURS PRN
Status: DISCONTINUED | OUTPATIENT
Start: 2023-12-17 | End: 2023-12-17 | Stop reason: HOSPADM

## 2023-12-17 RX ORDER — FENTANYL CITRATE/PF 50 MCG/ML
25 SYRINGE (ML) INJECTION
Status: DISCONTINUED | OUTPATIENT
Start: 2023-12-17 | End: 2023-12-17 | Stop reason: HOSPADM

## 2023-12-17 RX ADMIN — SODIUM CHLORIDE, SODIUM LACTATE, POTASSIUM CHLORIDE, AND CALCIUM CHLORIDE 125 ML/HR: .6; .31; .03; .02 INJECTION, SOLUTION INTRAVENOUS at 00:46

## 2023-12-17 RX ADMIN — SUGAMMADEX 200 MG: 100 INJECTION, SOLUTION INTRAVENOUS at 02:38

## 2023-12-17 RX ADMIN — ONDANSETRON 4 MG: 2 INJECTION INTRAMUSCULAR; INTRAVENOUS at 02:38

## 2023-12-17 RX ADMIN — SODIUM CHLORIDE, SODIUM LACTATE, POTASSIUM CHLORIDE, AND CALCIUM CHLORIDE: .6; .31; .03; .02 INJECTION, SOLUTION INTRAVENOUS at 02:24

## 2023-12-17 RX ADMIN — ACETAMINOPHEN 1000 MG: 10 INJECTION INTRAVENOUS at 01:56

## 2023-12-17 RX ADMIN — LIDOCAINE HYDROCHLORIDE 100 MG: 10 INJECTION, SOLUTION EPIDURAL; INFILTRATION; INTRACAUDAL; PERINEURAL at 01:41

## 2023-12-17 RX ADMIN — MIDAZOLAM 2 MG: 1 INJECTION INTRAMUSCULAR; INTRAVENOUS at 01:38

## 2023-12-17 RX ADMIN — PROPOFOL 200 MG: 10 INJECTION, EMULSION INTRAVENOUS at 01:41

## 2023-12-17 RX ADMIN — GLYCOPYRROLATE 0.4 MG: 0.2 INJECTION INTRAMUSCULAR; INTRAVENOUS at 01:38

## 2023-12-17 RX ADMIN — ROCURONIUM BROMIDE 50 MG: 10 INJECTION, SOLUTION INTRAVENOUS at 01:41

## 2023-12-17 RX ADMIN — FENTANYL CITRATE 100 MCG: 50 INJECTION INTRAMUSCULAR; INTRAVENOUS at 01:41

## 2023-12-17 RX ADMIN — PROPOFOL 100 MG: 10 INJECTION, EMULSION INTRAVENOUS at 02:45

## 2023-12-17 RX ADMIN — DEXAMETHASONE SODIUM PHOSPHATE 10 MG: 10 INJECTION INTRAMUSCULAR; INTRAVENOUS at 01:56

## 2023-12-17 NOTE — NURSING NOTE
Pt d/c to home via w/c and sig other. All instructions went over with pt and sig other prior to d/c. Knowledge was expressed

## 2023-12-17 NOTE — ASSESSMENT & PLAN NOTE
S/p ZACHARIAH 3/2022 at [facility] for dysmenorrhea with benign pathology

## 2023-12-17 NOTE — PROGRESS NOTES
"Gyn Progress Note   Aleja Tejada 38 y.o. female MRN: [id number]  Unit/Bed#: E2 -01 Encounter: [id number]      A/P: 39yo POD#0 from a left laparoscopic oophorectomy for ovarian torsion.    Left lower quadrant abdominal pain  Assessment & Plan  S/p RATLH-BS 3/2022 at [facility] for dysmenorrhea with benign pathology    * Status post left oophorectomy  Assessment & Plan  LLQ pain with evidence of left ovarian torsion on pelvic US with decreased doppler flow -> now POD#0 from a diagnostic laparoscopy, left oophorectomy and fulguration of endometriosis  Pain well controlled, voiding, passing flatus and ambulating  Will follow up at our [facility] for a post op visit        Dispo: home      S/O:  Patient has no complaints. No new events over night. Her pain is well controlled.  She denies vaginal bleeding.  She is tolerating PO.  She denies fevers, chills, N/V.  She is passing flatus, voiding and ambulating.    /62 (BP Location: Right arm)   Pulse 55   Temp 98.2 °F (36.8 °C) (Temporal)   Resp 18   Ht 5' 7\" (1.702 m)   Wt 76.7 kg (169 lb 1.5 oz)   LMP 03/24/2021   SpO2 98%   BMI 26.48 kg/m²     Lab Results   Component Value Date    WBC 11.05 (H) 12/16/2023    HGB 12.6 12/16/2023    HCT 38.6 12/16/2023    MCV 84 12/16/2023     12/16/2023       Lab Results   Component Value Date    CALCIUM 9.4 12/16/2023    K 3.7 12/16/2023    CO2 25 12/16/2023     12/16/2023    BUN 12 12/16/2023    CREATININE 0.85 12/16/2023       No results found for: \"POCGLU\"    I/O last 3 completed shifts:  In: 2250 [I.V.:1250; IV Piggyback:1000]  Out: 170 [Urine:150; Blood:20]  No intake/output data recorded.      Physical Exam  GEN: The patient was alert and oriented x3, pleasant well-appearing female in no acute distress.   CV: Regular rate  PULM: Non-labored respirations  MSK: Normal gait  Skin: Warm, dry  Neuro: No focal deficits  Psych: Normal affect and judgement, cooperative  Abdomen: Soft, " non-tender, non-distended, incisions c/d/I covered with glue      Humera Phillips MD   OB/Gyn PGY-3  12/17/2023 11:09 AM

## 2023-12-17 NOTE — PLAN OF CARE
Problem: PAIN - ADULT  Goal: Verbalizes/displays adequate comfort level or baseline comfort level  Description: Interventions:  - Encourage patient to monitor pain and request assistance  - Assess pain using appropriate pain scale  - Administer analgesics based on type and severity of pain and evaluate response  - Implement non-pharmacological measures as appropriate and evaluate response  - Consider cultural and social influences on pain and pain management  - Notify physician/advanced practitioner if interventions unsuccessful or patient reports new pain  12/17/2023 0454 by Wendy Sharif  Outcome: Progressing  12/17/2023 0454 by Wendy Sharif  Outcome: Progressing

## 2023-12-17 NOTE — H&P
"H&P Exam - Gynecology  Aleja Tejada 38 y.o. female MRN: [id number]  Unit/Bed#: ED-41 Encounter: [id number]    Assessment/Plan   * Left lower quadrant abdominal pain  Assessment & Plan  LLQ pain with evidence of left ovarian torsion on pelvic US with decreased doppler flow.  S/p RATLH-BS 3/2022 at [facility] for dysmenorrhea with benign pathology.    Consented for EUA, diagnostic laparoscopy, possible oophorectomy, possible ovarian cystectomy  NPO  Anesthesia aware  Case request placed  Hospital supervisor contacted  Will proceed urgently in order to maximize chances of preserving her ovary      HPI:  Aleja Tejada is a 38 y.o. female who presents with left sided abdominal pain. Pain started this morning and is \"400/10\" at its worst but 8/10 at best.  The pain is constant but intermittently becomes worse and sharp and is primarily in her LLQ but extends up her left side.  She endorses nausea and vomiting (5 episodes since onset of pain).  She denies vaginal bleeding, vaginal discharge, constipation, diarrhea, urinary complaints, fever, or chills.  She had a hysterectomy at [facility] in March 2022 for dysmenorrhea with benign pathology during which she reports her heart \"slowed down or stopped.\"  I reviewed the op note with the patient, and there was no mention of this.  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  She had a CT in 5/2023 that showed a normal left ovary.    She takes no medications on a regular basis.  Her PMH is notable for cervical hydromyelia (for which she gets monitoring scans, last imaged via MRI 2/2022).  Her PSH is notable for a robotic hysterectomy with bilateral salpingectomy in 3/2022 with benign pathology (performed for dysmenorrhea).    Accepts blood products if indicated: yes  Accepts all life-saving measures including intubation & resuscitation with compressions: yes    Review of Systems   Constitutional:  Negative for chills and fever.   Eyes:  Negative for visual " disturbance.   Respiratory:  Negative for cough and shortness of breath.    Cardiovascular:  Negative for chest pain and leg swelling.   Gastrointestinal:  Positive for nausea and vomiting. Negative for abdominal pain and diarrhea.   Genitourinary:  Positive for pelvic pain. Negative for dysuria, frequency, hematuria, urgency, vaginal bleeding and vaginal discharge.   Neurological:  Negative for dizziness, light-headedness and headaches.       Historical Information   Past Medical History:   Diagnosis Date    Anxiety     Depression      Past Surgical History:   Procedure Laterality Date    COLONOSCOPY  03/29/2021    ULNAR NERVE REPAIR      UPPER GASTROINTESTINAL ENDOSCOPY  03/29/2021     OB History   No obstetric history on file.     History reviewed. No pertinent family history.  Social History   Social History     Substance and Sexual Activity   Alcohol Use Yes     Social History     Substance and Sexual Activity   Drug Use Yes    Types: Marijuana     Social History     Tobacco Use   Smoking Status Former   Smokeless Tobacco Former       Meds/Allergies   (Not in a hospital admission)    Allergies   Allergen Reactions    Poison Ivy Extract Hives     Pesticides    Apple - Food Allergy Swelling    Bupropion Hives    Pear - Food Allergy Swelling    Strawberry Extract - Food Allergy Swelling    Other Rash    Risperidone Rash and Hives    Sertraline Rash and Hives       Objective     Vitals:  BP 99/82 (BP Location: Left arm)   Pulse 56   Temp 98.2 °F (36.8 °C) (Oral)   Resp 20   Wt 77 kg (169 lb 12.1 oz)   LMP 03/24/2021   SpO2 98%   BMI 26.59 kg/m²     No intake/output data recorded.  I/O this shift:  In: 1000 [IV Piggyback:1000]  Out: -     Lab Results   Component Value Date    WBC 11.05 (H) 12/16/2023    HGB 12.6 12/16/2023    HCT 38.6 12/16/2023    MCV 84 12/16/2023     12/16/2023       Lab Results   Component Value Date    CALCIUM 9.4 12/16/2023    K 3.7 12/16/2023    CO2 25 12/16/2023      2023    BUN 12 2023    CREATININE 0.85 2023       Physical Exam  Exam conducted with a chaperone present.   Constitutional:       General: She is not in acute distress.     Appearance: Normal appearance. She is not ill-appearing.   HENT:      Mouth/Throat:      Mouth: Mucous membranes are moist.   Eyes:      Extraocular Movements: Extraocular movements intact.   Cardiovascular:      Rate and Rhythm: Normal rate and regular rhythm.      Heart sounds: Normal heart sounds.   Pulmonary:      Effort: Pulmonary effort is normal.      Breath sounds: Normal breath sounds.   Abdominal:      General: There is no distension.      Palpations: Abdomen is soft.      Tenderness: There is abdominal tenderness (LLQ moderate tenderness to palpation). There is no guarding.   Genitourinary:     General: Normal vulva.      Exam position: Lithotomy position.      Trav stage (genital): 5.      Labia:         Right: No rash, tenderness or lesion.         Left: No rash, tenderness, lesion or injury.       Uterus: Absent.       Adnexa: Right adnexa normal.        Right: No mass, tenderness or fullness.          Left: Tenderness (moderate tenderness to palpation) present. No mass or fullness.     Musculoskeletal:         General: No swelling or tenderness.      Right lower leg: No edema.      Left lower leg: No edema.   Skin:     General: Skin is warm and dry.      Coloration: Skin is not jaundiced or pale.   Neurological:      General: No focal deficit present.      Mental Status: She is alert.   Psychiatric:         Mood and Affect: Mood normal.         Behavior: Behavior normal.         Thought Content: Thought content normal.         Judgment: Judgment normal.         Imagin/16/23 CT ABDOMEN AND PELVIS WITH IV CONTRAST IMPRESSION:  No bowel obstruction. Mild diffuse thickening of the colon which may be due to nonspecific colitis.  Asymmetric enlargement of the left ovary measuring up to 4.2 cm. Further  evaluation with ultrasound may be obtained.    12/16/23 PELVIC ULTRASOUND, COMPLETE IMPRESSION:  Findings suspicious for left ovarian torsion as detailed above.    EKG, Pathology, and Other Studies: I have personally reviewed pertinent reports.      Code Status: Level 1        Celia Glaser MD  12/17/2023  12:04 AM

## 2023-12-17 NOTE — DISCHARGE INSTRUCTIONS
Laparoscopic Oophorectomy   WHAT YOU NEED TO KNOW:   A laparoscopic oophorectomy is surgery to remove one or both of your ovaries. Your surgeon will use a laparoscope (a thin tube with a light and tiny video camera on the end) and small tools to remove your ovaries. He or she may use a robot (machine) that has mechanical arms to operate the tools. This is called a robotic-assisted laparoscopic oophorectomy.       DISCHARGE INSTRUCTIONS:   Call your local emergency number (911 in the [place]) for any of the following:   You feel lightheaded, short of breath, and have chest pain.    You cough up blood.    Seek care immediately if:   Your arm or leg feels warm, tender, and painful. It may look swollen and red.    Blood soaks through your bandage.    Your stitches come apart.    Call your doctor if:   You have a fever or chills.    Your wound is red, swollen, or draining pus.    You have pus or a foul-smelling odor coming from your vagina.    You have nausea or are vomiting.    You have trouble urinating or having a bowel movement.    You have questions or concerns about your condition or care.    Medicines:  You may need any of the following:  NSAIDs , such as ibuprofen, help decrease swelling, pain, and fever. NSAIDs can cause stomach bleeding or kidney problems in certain people. If you take blood thinner medicine, always ask your healthcare provider if NSAIDs are safe for you. Always read the medicine label and follow directions.    Prescription pain medicine  may be given. Ask your healthcare provider how to take this medicine safely. Some prescription pain medicines contain acetaminophen. Do not take other medicines that contain acetaminophen without talking to your healthcare provider. Too much acetaminophen may cause liver damage. Prescription pain medicine may cause constipation. Ask your healthcare provider how to prevent or treat constipation.     Hormone medicine  may be given if both ovaries are removed. This  medicine will replace hormones in your body that your ovaries produced. You may not be started on this medicine for until 2 to 3 months after surgery.    Take your medicine as directed.  Contact your healthcare provider if you think your medicine is not helping or if you have side effects. Tell your provider if you are allergic to any medicine. Keep a list of the medicines, vitamins, and herbs you take. Include the amounts, and when and why you take them. Bring the list or the pill bottles to follow-up visits. Carry your medicine list with you in case of an emergency.    Care for your surgery area as directed:  When you are allowed to bathe, carefully wash around the area with soap and water. Gently dry the area and put on new, clean bandages as directed. Change your bandages when they get wet or dirty. If you have strips of medical tape, let them fall off on their own.  Activity:  Ask your healthcare provider when you can return to your normal activities. Do not have sex, douche, or use tampons until your healthcare provider says it is okay. These actions may cause an infection. Do not exercise or lift anything heavy until your healthcare provider says it is okay. This may put too much stress on your incision.  Ask for help:  This surgery may be life-changing for you and your family. You will no longer be able to get pregnant if both of your ovaries are removed. Sudden changes in the levels of your hormones may occur and cause mood swings and depression. You may feel angry, sad, or frightened, or cry frequently and unexpectedly. These feelings are normal. Talk to your healthcare provider about where you can get support.  Follow up with your doctor as directed:  Write down your questions so you remember to ask them during your visits.  © Copyright [employer] 2023 Information is for End User's use only and may not be sold, redistributed or otherwise used for commercial purposes.  The above information is an educational  aid only. It is not intended as medical advice for individual conditions or treatments. Talk to your doctor, nurse or pharmacist before following any medical regimen to see if it is safe and effective for you.

## 2023-12-17 NOTE — ASSESSMENT & PLAN NOTE
LLQ pain with evidence of left ovarian torsion on pelvic US with decreased doppler flow.  S/p RATLH-BS 3/2022 at [facility] for dysmenorrhea with benign pathology.    Consented for EUA, diagnostic laparoscopy, possible oophorectomy, possible ovarian cystectomy  NPO  Anesthesia aware  Case request placed  Hospital supervisor contacted  Will proceed urgently in order to maximize chances of preserving her ovary

## 2023-12-17 NOTE — OP NOTE
OPERATIVE REPORT  PATIENT NAME: Aleja Tejada    :  1985  MRN: [id number]  Pt Location: [facility] OR ROOM 06    SURGERY DATE: 2023    Surgeons and Role:     * Susie Charles DO - Primary     * Celia Glaser MD - Assisting    Preop Diagnosis:  Ovarian torsion [N83.519]  Left lower quadrant abdominal pain [R10.32]    Post-Op Diagnosis Codes:     * Ovarian torsion [N83.519]     * Left lower quadrant abdominal pain [R10.32]    Procedure(s) (LRB):  LAPAROSCOPY DIAGNOSTIC LEFT OOPHORECTOMY (N/A)  FULGURATION OF ENDOMETRIOSIS (N/A)  EXAM UNDER ANESTHESIA (EUA) (N/A)    Specimen(s):  ID Type Source Tests Collected by Time Destination   1 : LEFT OVARY Tissue Ovary, Left TISSUE EXAM Susie CarlosDO [address] 2023 0214        EBL:  10 mL    Drains:  None    Anesthesia Type:   General ET    Operative Indications:  Ovarian torsion [N83.519]  Left lower quadrant abdominal pain [R10.32]     Operative Findings:  Right 0.5 cm cyst in right inguinal crease with no erythema/induration/drainage, and otherwise normal external female genitalia  Bimanual exam revealed:  Surgically absent uterus  Intact vaginal cuff  Left adnexal mass with pain on palpation  Laparoscopic exam revealed:  Grossly normal liver, stomach, intestines, and omentum  Surgically absent uterus and bilateral Fallopian tubes  Normal right ovary  Torsed left ovary which was enlarged and thrombosed and therefore nonviable and requiring oophorectomy  Peritoneal endometrial implants overlying the vaginal cuff    Operative Technique:  Patient was taken to the operating room where correct patient and correct procedure were confirmed. General endotracheal anesthesia (GET) was administered, and the patient was positioned on the OR table in the dorsal lithotomy position.  All pressure points were padded, and a ambar hugger was placed to maintain control of core body temperature.  The patient was prepped and draped in the usual sterile fashion with chloroprep on the  abdomen and chlorhexidine prep on the vagina and perineum.  A time out was performed.    A straight catheter was introduced into the bladder, which was drained of 150 mL of clear yellow urine.  Sterile gloves were then exchanged, and attention was turned to the abdomen.     After injection of local, a vertical 5 mm incision was made at the inferior edge of the umbilicus for introduction of a 5 mm trocar.  The trocar was introduced under direct visualization.  Pneumoperitoneum was established to a maximum of 15 mmHg.  The entire abdomen and pelvis was inspected in a systematic fashion, and there was no evidence of entry site injury or injury to bowel, bladder, vasculature, or other structures.  Two additional port sites were selected in the left and right lower abdominal quadrants approximately 2 cm superior and medial to the anterior superior iliac spines.  After injection of local, a 5 mm incision was made for introduction of a 5 mm trocar under direct visualization at each site.  Attention was then turned to the pelvis.    Patient was placed in Trendelenburg, and a blunt probe was used to elevate the bowels out of the pelvis.  Laparoscopic exam showed the above-mentioned findings.  Using an atraumatic bowel grasper, the left ovary was elevated, and using the Enseal, the left IP ligament was coagulated and cut away from the path of the ureter.  Excellent hemostasis of the pedicle was appreciated.  Using the Enseal device, the endometriosis implants were fulgurated.    The right trocar was removed, and the incision was extended to accommodate an 11 mm trocar which was inserted under direct visualization.  A 10 mm specimen bag was inserted into the abdomen, and the specimen was placed inside and then removed from the abdomen.  The specimen was sent for routine pathology.  The fascia at the right port site was closed with 0 Vicryl in single interrupted fashion.    Pneumoperitoneum was allowed to escape.  Adequate  hemostasis was visualized under low intra-abdominal pressure.  The left trocar was removed under direct visualization.  The laparoscope was withdrawn from the abdomen, followed by its trocar sleeve at the umbilicus.  Skin incisions were closed with 4-0 Monocryl and coated with Exofin.    At the conclusion of the procedure, all needle, sponge, and instrument counts were noted to be correct x2. Patient tolerated the procedure well and was transferred to PACU in stable condition prior to discharge with follow up in 1-2 weeks.    Dr. Em was present and participated in the entire case.    Complications:   None apparent    Patient Disposition:  PACU         SIGNATURE: Celia Glaser MD  DATE: December 17, 2023  TIME: 2:42 AM    Attending Physician/Surgeon Statement  I was present for and participated in all key surgical aspects of this patient's care.  I agree with the resident's documentation as stated above.    Susie Charles  12/17/23  8:18 AM

## 2023-12-17 NOTE — ANESTHESIA PREPROCEDURE EVALUATION
Procedure:  LAPAROSCOPY DIAGNOSTIC (Uterus)  EXAM UNDER ANESTHESIA (EUA) (Pelvis)    Relevant Problems   ANESTHESIA (within normal limits)      CARDIO (within normal limits)      ENDO (within normal limits)      GI/HEPATIC   (+) Gastroesophageal reflux disease      /RENAL (within normal limits)      GYN (within normal limits)      HEMATOLOGY (within normal limits)      MUSCULOSKELETAL (within normal limits)      NEURO/PSYCH   (+) Anxiety   (+) Depression      PULMONARY (within normal limits)        Physical Exam    Airway    Mallampati score: III  TM Distance: >3 FB  Neck ROM: full     Dental   No notable dental hx     Cardiovascular  Rhythm: regular, Rate: abnormal    Pulmonary  Pulmonary exam normal Breath sounds clear to auscultation    Other Findings  post-pubertal.      Anesthesia Plan  ASA Score- 2 Emergent    Anesthesia Type- general with ASA Monitors.         Additional Monitors:     Airway Plan: ETT.           Plan Factors-Exercise tolerance (METS): >4 METS.    Chart reviewed.   Existing labs reviewed. Patient summary reviewed.    Patient is a current smoker.  Patient instructed to abstain from smoking on day of procedure. Patient smoked on day of surgery.            Induction- intravenous.    Postoperative Plan- Plan for postoperative opioid use. Planned trial extubation    Informed Consent- Anesthetic plan and risks discussed with patient.

## 2023-12-17 NOTE — ASSESSMENT & PLAN NOTE
LLQ pain with evidence of left ovarian torsion on pelvic US with decreased doppler flow -> now POD#0 from a diagnostic laparoscopy, left oophorectomy and fulguration of endometriosis  Pain well controlled, voiding, passing flatus and ambulating  Will follow up at our [facility] clinic for a post op visit

## 2023-12-17 NOTE — ANESTHESIA POSTPROCEDURE EVALUATION
Post-Op Assessment Note    CV Status:  Stable    Pain management: adequate       Mental Status:  Alert and awake   Hydration Status:  Euvolemic   PONV Controlled:  Controlled   Airway Patency:  Patent     Post Op Vitals Reviewed: Yes      Staff: Anesthesiologist, CRNA               /67 (12/17/23 0400)    Temp 97.8 °F (36.6 °C) (12/17/23 0400)    Pulse 56 (12/17/23 0400)   Resp 20 (12/17/23 0400)    SpO2 97 % (12/17/23 0400)

## 2023-12-21 PROCEDURE — 88305 TISSUE EXAM BY PATHOLOGIST: CPT | Performed by: PATHOLOGY

## 2023-12-26 ENCOUNTER — TELEPHONE (OUTPATIENT)
Age: 38
End: 2023-12-26

## 2023-12-26 NOTE — TELEPHONE ENCOUNTER
Patient left [employer] message requesting appointment with her PCP.    Message directed to wrong office.  Left message for patient, Aleja, to contact P[facility] and provided correct office number.    Re-routed message to PCP at [facility].

## (undated) DEVICE — BETHLEHEM UNIVERSAL MINOR VAG: Brand: CARDINAL HEALTH

## (undated) DEVICE — SYRINGE 50ML LL

## (undated) DEVICE — PVC URETHRAL CATHETER: Brand: DOVER

## (undated) DEVICE — SUT VICRYL 0 UR-6 27 IN J603H

## (undated) DEVICE — BLUE HEAT SCOPE WARMER

## (undated) DEVICE — 2, DISPOSABLE SUCTION/IRRIGATOR WITHOUT DISPOSABLE TIP: Brand: STRYKEFLOW

## (undated) DEVICE — TUBING SMOKE EVAC W/FILTRATION DEVICE PLUMEPORT ACTIV

## (undated) DEVICE — IV EXTENSION TUBING 33 IN

## (undated) DEVICE — METZENBAUM ADTEC SINGLE USE DISSECTING SCISSORS, SHAFT ONLY, MONOPOLAR, CURVED TO LEFT, WORKING LENGTH: 12 1/4", (310 MM), DIAM. 5 MM, INSULATED, DOUBLE ACTION, STERILE, DISPOSABLE, PACKAGE OF 10 PIECES: Brand: AESCULAP

## (undated) DEVICE — CHLORAPREP HI-LITE 26ML ORANGE

## (undated) DEVICE — TROCAR: Brand: KII® SLEEVE

## (undated) DEVICE — INTENDED FOR TISSUE SEPARATION, AND OTHER PROCEDURES THAT REQUIRE A SHARP SURGICAL BLADE TO PUNCTURE OR CUT.: Brand: BARD-PARKER SAFETY BLADES SIZE 11, STERILE

## (undated) DEVICE — TROCAR: Brand: KII FIOS FIRST ENTRY

## (undated) DEVICE — [HIGH FLOW INSUFFLATOR,  DO NOT USE IF PACKAGE IS DAMAGED,  KEEP DRY,  KEEP AWAY FROM SUNLIGHT,  PROTECT FROM HEAT AND RADIOACTIVE SOURCES.]: Brand: PNEUMOSURE

## (undated) DEVICE — 3000CC GUARDIAN II: Brand: GUARDIAN

## (undated) DEVICE — PREMIUM DRY TRAY LF: Brand: MEDLINE INDUSTRIES, INC.

## (undated) DEVICE — 5 MM CURVED DISSECTORS WITH MONOPOLAR CAUTERY: Brand: ENDOPATH

## (undated) DEVICE — ENSEAL X1 TISSUE SEALER, CURVED JAW, 37 CM SHAFT LENGTH: Brand: ENSEAL

## (undated) DEVICE — GLOVE INDICATOR PI UNDERGLOVE SZ 7 BLUE

## (undated) DEVICE — SUT MONOCRYL 4-0 PS-2 27 IN Y426H

## (undated) DEVICE — GLOVE PI ULTRA TOUCH SZ.7.0

## (undated) DEVICE — SCD SEQUENTIAL COMPRESSION COMFORT SLEEVE MEDIUM KNEE LENGTH: Brand: KENDALL SCD

## (undated) DEVICE — BETHLEHEM UNIVERSAL GYN LAP PK: Brand: CARDINAL HEALTH

## (undated) DEVICE — ADHESIVE SKIN HIGH VISCOSITY EXOFIN 1ML